# Patient Record
Sex: FEMALE | Race: WHITE | NOT HISPANIC OR LATINO
[De-identification: names, ages, dates, MRNs, and addresses within clinical notes are randomized per-mention and may not be internally consistent; named-entity substitution may affect disease eponyms.]

---

## 2017-08-23 ENCOUNTER — APPOINTMENT (OUTPATIENT)
Dept: MRI IMAGING | Facility: CLINIC | Age: 52
End: 2017-08-23

## 2017-08-23 ENCOUNTER — OUTPATIENT (OUTPATIENT)
Dept: OUTPATIENT SERVICES | Facility: HOSPITAL | Age: 52
LOS: 1 days | End: 2017-08-23
Payer: COMMERCIAL

## 2017-08-23 DIAGNOSIS — Z00.8 ENCOUNTER FOR OTHER GENERAL EXAMINATION: ICD-10-CM

## 2017-08-23 PROCEDURE — 73721 MRI JNT OF LWR EXTRE W/O DYE: CPT | Mod: 26,RT

## 2017-08-23 PROCEDURE — 73721 MRI JNT OF LWR EXTRE W/O DYE: CPT

## 2017-11-02 ENCOUNTER — OTHER (OUTPATIENT)
Age: 52
End: 2017-11-02

## 2018-03-29 ENCOUNTER — APPOINTMENT (OUTPATIENT)
Dept: MRI IMAGING | Facility: CLINIC | Age: 53
End: 2018-03-29
Payer: COMMERCIAL

## 2018-03-29 ENCOUNTER — OUTPATIENT (OUTPATIENT)
Dept: OUTPATIENT SERVICES | Facility: HOSPITAL | Age: 53
LOS: 1 days | End: 2018-03-29
Payer: COMMERCIAL

## 2018-03-29 DIAGNOSIS — Z00.8 ENCOUNTER FOR OTHER GENERAL EXAMINATION: ICD-10-CM

## 2018-03-29 PROCEDURE — 73221 MRI JOINT UPR EXTREM W/O DYE: CPT | Mod: 26,RT

## 2018-03-29 PROCEDURE — 73221 MRI JOINT UPR EXTREM W/O DYE: CPT

## 2018-06-22 ENCOUNTER — FORM ENCOUNTER (OUTPATIENT)
Age: 53
End: 2018-06-22

## 2018-06-23 ENCOUNTER — APPOINTMENT (OUTPATIENT)
Dept: MAMMOGRAPHY | Facility: CLINIC | Age: 53
End: 2018-06-23

## 2018-06-23 ENCOUNTER — OUTPATIENT (OUTPATIENT)
Dept: OUTPATIENT SERVICES | Facility: HOSPITAL | Age: 53
LOS: 1 days | End: 2018-06-23
Payer: COMMERCIAL

## 2018-06-23 DIAGNOSIS — Z00.8 ENCOUNTER FOR OTHER GENERAL EXAMINATION: ICD-10-CM

## 2018-06-23 PROCEDURE — 77067 SCR MAMMO BI INCL CAD: CPT

## 2018-06-23 PROCEDURE — 77063 BREAST TOMOSYNTHESIS BI: CPT | Mod: 26

## 2018-06-23 PROCEDURE — 77063 BREAST TOMOSYNTHESIS BI: CPT

## 2018-06-23 PROCEDURE — 77067 SCR MAMMO BI INCL CAD: CPT | Mod: 26

## 2018-07-01 ENCOUNTER — FORM ENCOUNTER (OUTPATIENT)
Age: 53
End: 2018-07-01

## 2018-07-02 ENCOUNTER — OUTPATIENT (OUTPATIENT)
Dept: OUTPATIENT SERVICES | Facility: HOSPITAL | Age: 53
LOS: 1 days | End: 2018-07-02
Payer: COMMERCIAL

## 2018-07-02 ENCOUNTER — APPOINTMENT (OUTPATIENT)
Dept: ULTRASOUND IMAGING | Facility: CLINIC | Age: 53
End: 2018-07-02
Payer: COMMERCIAL

## 2018-07-02 ENCOUNTER — RX RENEWAL (OUTPATIENT)
Age: 53
End: 2018-07-02

## 2018-07-02 ENCOUNTER — APPOINTMENT (OUTPATIENT)
Dept: MAMMOGRAPHY | Facility: CLINIC | Age: 53
End: 2018-07-02
Payer: COMMERCIAL

## 2018-07-02 DIAGNOSIS — Z00.8 ENCOUNTER FOR OTHER GENERAL EXAMINATION: ICD-10-CM

## 2018-07-02 PROCEDURE — G0279: CPT | Mod: 26

## 2018-07-02 PROCEDURE — 77065 DX MAMMO INCL CAD UNI: CPT | Mod: 26,RT

## 2018-07-02 PROCEDURE — 76642 ULTRASOUND BREAST LIMITED: CPT | Mod: 26,RT

## 2018-07-02 PROCEDURE — 77065 DX MAMMO INCL CAD UNI: CPT

## 2018-07-02 PROCEDURE — G0279: CPT

## 2018-07-02 PROCEDURE — 76642 ULTRASOUND BREAST LIMITED: CPT

## 2018-09-13 ENCOUNTER — TRANSCRIPTION ENCOUNTER (OUTPATIENT)
Age: 53
End: 2018-09-13

## 2018-11-22 ENCOUNTER — EMERGENCY (EMERGENCY)
Facility: HOSPITAL | Age: 53
LOS: 1 days | Discharge: HOME | End: 2018-11-22
Attending: EMERGENCY MEDICINE
Payer: COMMERCIAL

## 2018-11-22 VITALS
RESPIRATION RATE: 16 BRPM | HEIGHT: 62.5 IN | TEMPERATURE: 99 F | SYSTOLIC BLOOD PRESSURE: 140 MMHG | HEART RATE: 67 BPM | WEIGHT: 162.92 LBS | DIASTOLIC BLOOD PRESSURE: 81 MMHG | OXYGEN SATURATION: 99 %

## 2018-11-22 PROCEDURE — 99282 EMERGENCY DEPT VISIT SF MDM: CPT

## 2018-11-22 NOTE — ED PROVIDER NOTE - OBJECTIVE STATEMENT
54 yo F presents to ED for evaluation of avulsion to distal tip of R 4th finger sustained about 90 mins pta with a slicer while cutting potatoes. Tetanus UTD. Denies numbness/tingling.

## 2018-11-22 NOTE — ED ADULT NURSE NOTE - CHPI ED NUR SYMPTOMS NEG
no rectal pain/no redness/no blood in mucus/no chills/no drainage/no pain/no fever/no vomiting/no purulent drainage

## 2018-11-22 NOTE — ED PROVIDER NOTE - ATTENDING CONTRIBUTION TO CARE
I have personally performed a face to face diagnostic evaluation on this patient.  I have reviewed the PA note and agree with the history, exam, and plan of care, except as noted.  History and Exam by me shows right 4th finger avulsion from [place] slicer today.  right 4th finger- 1cm avulsion flap diameter.  Wound care applied.

## 2018-11-22 NOTE — ED ADULT NURSE NOTE - NSIMPLEMENTINTERV_GEN_ALL_ED
Implemented All Universal Safety Interventions:  [place] to call system. Call bell, personal items and telephone within reach. Instruct patient to call for assistance. Room bathroom lighting operational. Non-slip footwear when patient is off stretcher. Physically safe environment: no spills, clutter or unnecessary equipment. Stretcher in lowest position, wheels locked, appropriate side rails in place.

## 2018-11-22 NOTE — ED ADULT NURSE NOTE - OBJECTIVE STATEMENT
Pt A&Ox4, ambulatory to ED c/o laceration to finger.  Pt states she was slicing potatoes on a mandolin slicer and sustained laceration to right 4th finger.

## 2019-06-03 ENCOUNTER — APPOINTMENT (OUTPATIENT)
Dept: ULTRASOUND IMAGING | Facility: CLINIC | Age: 54
End: 2019-06-03
Payer: COMMERCIAL

## 2019-06-03 ENCOUNTER — OUTPATIENT (OUTPATIENT)
Dept: OUTPATIENT SERVICES | Facility: HOSPITAL | Age: 54
LOS: 1 days | End: 2019-06-03
Payer: COMMERCIAL

## 2019-06-03 DIAGNOSIS — Z00.8 ENCOUNTER FOR OTHER GENERAL EXAMINATION: ICD-10-CM

## 2019-06-03 PROCEDURE — 76830 TRANSVAGINAL US NON-OB: CPT

## 2019-06-03 PROCEDURE — 76856 US EXAM PELVIC COMPLETE: CPT | Mod: 26

## 2019-06-03 PROCEDURE — 76856 US EXAM PELVIC COMPLETE: CPT

## 2019-06-03 PROCEDURE — 76830 TRANSVAGINAL US NON-OB: CPT | Mod: 26

## 2019-06-04 ENCOUNTER — APPOINTMENT (OUTPATIENT)
Dept: UROGYNECOLOGY | Facility: CLINIC | Age: 54
End: 2019-06-04

## 2019-06-06 ENCOUNTER — TRANSCRIPTION ENCOUNTER (OUTPATIENT)
Age: 54
End: 2019-06-06

## 2019-06-06 ENCOUNTER — APPOINTMENT (OUTPATIENT)
Dept: UROLOGY | Facility: CLINIC | Age: 54
End: 2019-06-06
Payer: COMMERCIAL

## 2019-06-06 VITALS
WEIGHT: 160 LBS | SYSTOLIC BLOOD PRESSURE: 118 MMHG | DIASTOLIC BLOOD PRESSURE: 70 MMHG | HEART RATE: 77 BPM | OXYGEN SATURATION: 98 % | HEIGHT: 62 IN | BODY MASS INDEX: 29.44 KG/M2 | RESPIRATION RATE: 14 BRPM

## 2019-06-06 PROCEDURE — 99203 OFFICE O/P NEW LOW 30 MIN: CPT

## 2019-06-06 NOTE — REVIEW OF SYSTEMS
[Feeling Tired] : feeling tired [Earache] : earache [Dry Eyes] : dryness of the eyes [Cough] : cough [Sore Throat] : sore throat [Abdominal Pain] : abdominal pain [Diarrhea] : diarrhea [Dizziness] : dizziness [Constipation] : constipation [Hot Flashes] : hot flashes [Anxiety] : anxiety [Feelings Of Weakness] : feelings of weakness [Negative] : Integumentary [FreeTextEntry3] : sinus problems [FreeTextEntry2] : loss of interest, dysuria, nocturia, urgency, frequency, slow stream, leakage

## 2019-06-06 NOTE — HISTORY OF PRESENT ILLNESS
[FreeTextEntry1] : She is a 53-year-old woman who is seen today for initial visit. Patient states that she has bladder and uterine prolapse which is worsening recently. It worsens with physical activity and exercise. She also has stress incontinence but no significant urgency or urge incontinence. She had 5 vaginal deliveries up to 8 lbs. 9 oz. She has also seen a uro-gynecologist for another consultation. Residual urine today was 150 cc which is the same as her previous ultrasound. She is able to push her prolapse inside without difficulty.

## 2019-06-06 NOTE — ASSESSMENT
[FreeTextEntry1] : She does not fully empty her bladder. On physical exam today, no obvious prolapse except minimal rectocele was noted. Patient states that it does become worse with physical activity and she notices the bladder and the uterus near the vaginal opening. I believe her best option would be to consider hysterectomy vaginally or laparoscopic/robotically plus cystocele repair and sling placement. Risks and benefits of using mesh and also FDA warnings were discussed.

## 2019-06-06 NOTE — PHYSICAL EXAM
[General Appearance - Well Developed] : well developed [Normal Appearance] : normal appearance [General Appearance - Well Nourished] : well nourished [Well Groomed] : well groomed [General Appearance - In No Acute Distress] : no acute distress [Edema] : no peripheral edema [Exaggerated Use Of Accessory Muscles For Inspiration] : no accessory muscle use [Respiration, Rhythm And Depth] : normal respiratory rhythm and effort [Costovertebral Angle Tenderness] : no ~M costovertebral angle tenderness [Abdomen Tenderness] : non-tender [Abdomen Soft] : soft [Normal Station and Gait] : the gait and station were normal for the patient's age [FreeTextEntry1] : RN in room. No obvious prolapse or urine leak noted with cough on supine position. [] : no rash [Oriented To Time, Place, And Person] : oriented to person, place, and time [No Focal Deficits] : no focal deficits [Not Anxious] : not anxious [Mood] : the mood was normal [Affect] : the affect was normal [Inguinal Lymph Nodes Enlarged Bilaterally] : inguinal

## 2019-06-07 ENCOUNTER — APPOINTMENT (OUTPATIENT)
Dept: UROGYNECOLOGY | Facility: CLINIC | Age: 54
End: 2019-06-07
Payer: COMMERCIAL

## 2019-06-07 VITALS
DIASTOLIC BLOOD PRESSURE: 71 MMHG | TEMPERATURE: 97.7 F | BODY MASS INDEX: 29.44 KG/M2 | OXYGEN SATURATION: 100 % | HEIGHT: 62 IN | HEART RATE: 71 BPM | WEIGHT: 160 LBS | SYSTOLIC BLOOD PRESSURE: 102 MMHG

## 2019-06-07 DIAGNOSIS — Z83.511 FAMILY HISTORY OF GLAUCOMA: ICD-10-CM

## 2019-06-07 DIAGNOSIS — Z82.49 FAMILY HISTORY OF ISCHEMIC HEART DISEASE AND OTHER DISEASES OF THE CIRCULATORY SYSTEM: ICD-10-CM

## 2019-06-07 LAB
BILIRUB UR QL STRIP: NEGATIVE
CLARITY UR: CLEAR
COLLECTION METHOD: NORMAL
GLUCOSE UR-MCNC: NEGATIVE
HCG UR QL: 0.2 EU/DL
HGB UR QL STRIP.AUTO: NEGATIVE
KETONES UR-MCNC: NEGATIVE
LEUKOCYTE ESTERASE UR QL STRIP: NEGATIVE
NITRITE UR QL STRIP: NEGATIVE
PH UR STRIP: 5.5
PROT UR STRIP-MCNC: NEGATIVE
SP GR UR STRIP: 1.01

## 2019-06-07 PROCEDURE — 51701 INSERT BLADDER CATHETER: CPT

## 2019-06-07 PROCEDURE — 81003 URINALYSIS AUTO W/O SCOPE: CPT | Mod: QW

## 2019-06-07 PROCEDURE — 99204 OFFICE O/P NEW MOD 45 MIN: CPT | Mod: 25

## 2019-06-07 NOTE — HISTORY OF PRESENT ILLNESS
[FreeTextEntry1] : Few years of bothersome urinary frequency, urgency, leakage of urine with urgency as well as with positional changes / cough / sneeze; liners worn, currently nocturia 0-1. No fluid intake after 8pm anymore --> nocturia 0 usually. No dysuria, no gross hematuria, no flank pain. ? incomplete emptying vs frequency, flow normal and not interrupted. IBS, diarrhea predom. LMP 2017 then an episode of pink spotting last week, had TVUS earlier this week. No pain. Not SA at present. +Bulge and pressure in vagina, can palpate at introitus intermittently worse on active days and gravity-depending. Works at [employer], occasionally lifts.\par \par 6/3/19 TVUS: uterus 10 x 5 x 7cm, EE 4mm, R ov 2cm with 1cm simple cyst, L ov 2cm with <1cm simple cyst, no FF, PVR 170cc.

## 2019-06-07 NOTE — OB HISTORY
[Vaginal ___] : [unfilled] vaginal delivery(s) [FreeTextEntry1] : largest baby 8 /12 lbs [Sexually Active] : is not sexually active

## 2019-06-07 NOTE — PHYSICAL EXAM
[No Acute Distress] : in no acute distress [Oriented x3] : oriented to person, place, and time [No Edema] : ~T edema was not present [Symmetrical] : the neck was ~L symmetrical [None] : no CVA tenderness [Warm and Dry] : was warm and dry to touch [Normal Gait] : gait was normal [Labia Majora] : were normal [Labia Minora] : were normal [Bartholin's Gland] : both Bartholin's glands were normal  [Normal Appearance] : general appearance was normal [No Bleeding] : there was no active vaginal bleeding [2] : 2 [Aa ____] : Aa [unfilled] [Ba ____] : Ba [unfilled] [C ____] : C [unfilled] [GH ____] : GH [unfilled] [PB ____] : PB [unfilled] [TVL ____] : TVL  [unfilled] [Ap ____] : Ap [unfilled] [Bp ____] : Bp [unfilled] [D ____] : D [unfilled] [] : I [Normal] : normal [Soft] :  the cervix was soft [Post Void Residual ____ml] : post void residual via catheterization was [unfilled] ml [Exam Deferred] : was deferred [Tenderness] : ~T no ~M abdominal tenderness observed [Cough] : no cough [FreeTextEntry3] : +urethral hypermobility [Distended] : not distended [Inguinal LAD] : no adenopathy was noted in the inguinal lymph nodes [de-identified] : no appreciable masses or fullness [FreeTextEntry4] : no cysts lesions or mass [de-identified] : CST empty supine neg; uroflow normal voiding pattern and max flow rate

## 2019-06-07 NOTE — ASSESSMENT
[FreeTextEntry1] : Anjelica is a pleasant 52 yo P5, PSHx includes LSC appendectomy and cholecystectomy, presents with symptomatic POP and MICHAEL. On exam, her empty supine CST was neg, however she had positive urethral hypermobility. Her straight-cath PVR volume was normal and the dip was neg. On pelvic exam, there were no appreciable masses or lesions. Pelvic floor muscle contraction strength was present but weak. There was no levator or pelvic floor musculature banding, tightness, or tenderness. POPQ exam demonstrated stage II POP with primarily uterine descent.\par \par The patient has pelvic organ prolapse. Management options including observation, kegels with or without PT, biofeedback, pessary, and surgery were reviewed. Surgically, TVH/USLS reviewed, abdominally robotic THAD/ASC discussed. R/B/A and each surg discussed. Pessary care discussed. Her ovaries on sono - simple and at her age without strong fam/perosnal cancer hx leaving ovaries in situ discussed vs removal, R/B/A to each. Efficacies of procedures and risks reviewed briefly. She would like to think about her options.\par \par The etiology of OAB was discussed. Management options including observation, behavioral modifications (dietary changes, monitoring fluid intake, bladder training, timed voids, use of pads/protective garments), kegels, PT, medications, PTNS, SNS, and bladder Botox were all reviewed. The etiology of JOSUE was discussed. Management options including observation, behavioral modifications, medication, pessary, Impressa insert, periurethral bulking via cystoscopy, and surgery with midurethral sling were reviewed. She would like think think about options. All questions answered, RTO prn.\par \par Plan:\par [] obtain UDS report from Dr. Araujo office in 2018\par [] would like to think about options, if desires surgery here, next visit will be cystourethroscopy

## 2019-06-21 ENCOUNTER — APPOINTMENT (OUTPATIENT)
Dept: UROGYNECOLOGY | Facility: CLINIC | Age: 54
End: 2019-06-21

## 2019-08-19 ENCOUNTER — APPOINTMENT (OUTPATIENT)
Dept: UROGYNECOLOGY | Facility: CLINIC | Age: 54
End: 2019-08-19
Payer: COMMERCIAL

## 2019-08-19 PROCEDURE — 99214 OFFICE O/P EST MOD 30 MIN: CPT

## 2019-08-19 NOTE — PHYSICAL EXAM
[No Acute Distress] : in no acute distress [Oriented x3] : oriented to person, place, and time [Normal Memory] : ~T memory was ~L unimpaired [Normal Mood/Affect] : mood and affect are normal [None] : no CVA tenderness [Warm and Dry] : was warm and dry to touch [Normal Gait] : gait was normal [Labia Majora] : were normal [Labia Minora] : were normal [Normal Appearance] : general appearance was normal [Bartholin's Gland] : both Bartholin's glands were normal  [No Bleeding] : there was no active vaginal bleeding [2] : 2 [Aa ____] : Aa [unfilled] [Ba ____] : Ba [unfilled] [C ____] : C [unfilled] [GH ____] : GH [unfilled] [TVL ____] : TVL  [unfilled] [Ap ____] : Ap [unfilled] [Bp ____] : Bp [unfilled] [D ____] : D [unfilled] [] : I [Soft] :  the cervix was soft [Normal] : no abnormalities [Exam Deferred] : was deferred [Cough] : no cough [Tenderness] : ~T no ~M abdominal tenderness observed [Inguinal LAD] : no adenopathy was noted in the inguinal lymph nodes [Distended] : not distended

## 2019-08-19 NOTE — DISCUSSION/SUMMARY
[FreeTextEntry1] : We reviewed management options for her prolapse including: observation, pelvic floor exercises, pessary, and surgical management. We reviewed various surgical management options including vaginal, laparoscopic/robotic, abdominal procedures. We reviewed procedures involving hysterectomy as well as uterine sparing procedures. We also reviewed both mesh and non-mesh options. We discussed details of different surgical approaches and success rates/risks associated with the different procedures. Written IUGA information on prolapse treatment options was also provided to her to review. I counseled her that I agree with Dr García's assessment and plan. She will f/u with Dr García and obtain her urodynamic records from Dr Araujo and bring them with her to her next appt.

## 2019-08-19 NOTE — HISTORY OF PRESENT ILLNESS
[Cystocele (Obstetric)] : frequent [Uterine Prolapse] : none [Vaginal Wall Prolapse] : none [Rectal Prolapse] : none [Stress Incontinence] : none [Urge Incontinence Of Urine] : rare [Unable To Restrain Bowel Movement] : frequent [Urinary Frequency] : none [Feelings Of Urinary Urgency] : daily [x1] : once nightly [Urinary Tract Infection] : daily [Hematuria] : none [Constipation Obstructed Defecation] : frequent [Stool Visible Blood] : rare [] : years ago [Incomplete Emptying Of Stool] : none [de-identified] : 15-20x/day  [FreeTextEntry6] : I [de-identified] : Occurs with IBS flares  [FreeTextEntry1] : \yasmin Dejesus presents for consultation and additional opinion. She has pelvic organ prolapse and symptoms of mixed urinary incontinence. She has been seen by Dr Araujo, Dr Faustin, Dr Montanez and Dr García. She desires an additional opinion about her treatment options for her prolapse.

## 2019-08-21 ENCOUNTER — APPOINTMENT (OUTPATIENT)
Dept: UROGYNECOLOGY | Facility: CLINIC | Age: 54
End: 2019-08-21

## 2019-11-10 ENCOUNTER — TRANSCRIPTION ENCOUNTER (OUTPATIENT)
Age: 54
End: 2019-11-10

## 2019-12-30 ENCOUNTER — TRANSCRIPTION ENCOUNTER (OUTPATIENT)
Age: 54
End: 2019-12-30

## 2020-01-31 ENCOUNTER — TRANSCRIPTION ENCOUNTER (OUTPATIENT)
Age: 55
End: 2020-01-31

## 2020-08-27 NOTE — REASON FOR VISIT
[Pelvic Organ Prolapse] : pelvic organ prolapse [Urinary Incontinence] : urinary incontinence [Urine Frequency] : urine frequency

## 2020-08-28 ENCOUNTER — APPOINTMENT (OUTPATIENT)
Dept: UROGYNECOLOGY | Facility: CLINIC | Age: 55
End: 2020-08-28
Payer: COMMERCIAL

## 2020-08-28 PROCEDURE — 99214 OFFICE O/P EST MOD 30 MIN: CPT

## 2020-08-28 NOTE — ASSESSMENT
[FreeTextEntry1] : Stage II POP, MICHAEL, normal PVR. Reviewed options including observation, kegels with or without PT, biofeedback, pessary, and surgery were reviewed. Surg with or without hyst, with or without graft use, abd vs vag reviewed. Efficacies, R/B to each reviewed. POP correction to treat POP and not necessarily OAB symptoms reviewed. She wants surg, RTO for testing. All ques answered.\par \par Plan:\par [] cysto\par [] repeat UDS in setting of symptoms of JOSUE and neg CST/UDS prior testing --> ? MUS at time of surg\par [] surg discussion - anticipate booking RASCH / BS / ASC / ? MUS and APR / cysto\par

## 2020-08-28 NOTE — PHYSICAL EXAM
[Chaperone Present] : A chaperone was present in the examining room during all aspects of the physical examination [No Acute Distress] : in no acute distress [Oriented x3] : oriented to person, place, and time [Soft, Nontender] : the abdomen was soft and nontender [None] : no CVA tenderness [Labia Majora] : were normal [Labia Minora] : were normal [Normal Appearance] : general appearance was normal [No Bleeding] : there was no active vaginal bleeding [2] : 2 [Aa ____] : Aa [unfilled] [Ba ____] : Ba [unfilled] [C ____] : C [unfilled] [GH ____] : GH [unfilled] [PB ____] : PB [unfilled] [TVL ____] : TVL  [unfilled] [Ap ____] : Ap [unfilled] [Bp ____] : Bp [unfilled] [D ____] : D [unfilled] [] : I [Normal] : normal [Soft] :  the cervix was soft [Post Void Residual ____ml] : post void residual was [unfilled] ml [Exam Deferred] : was deferred [FreeTextEntry4] : no cyst mass or lesion [FreeTextEntry3] : +urethral hypermobility, CST neg [de-identified] : uterus mobile

## 2020-08-28 NOTE — HISTORY OF PRESENT ILLNESS
[FreeTextEntry1] : Patient last presented last year in 2019, found to have stage II POP with anterior and apical components; and MICHAEL, normal PVR. UDS report reviewed from 2018 elsewhere, low capacity bladder, normal PVR, no GSUI, no DO. 2019 TVUS: uterus 10 x 5 x 7cm, EE 4mm, R ov 2cm with 1cm simple cyst, L ov 2cm with <1cm simple cyst, no FF. Today, reports similar symptoms from last year - unchanged or perhaps little worsened bulge in vagina, some improved urge freq she attributes to PT (not pelvic floor, sounds like core work), and constipation is intermittent and diet-related. Unchanged small rare leakage of urine, unsure what situations. No hematuria, dysuria, UTIs, +subj incomplete emtpying.

## 2020-09-09 ENCOUNTER — APPOINTMENT (OUTPATIENT)
Dept: UROGYNECOLOGY | Facility: CLINIC | Age: 55
End: 2020-09-09
Payer: COMMERCIAL

## 2020-09-09 PROCEDURE — 51741 ELECTRO-UROFLOWMETRY FIRST: CPT

## 2020-09-09 PROCEDURE — 51784 ANAL/URINARY MUSCLE STUDY: CPT

## 2020-09-09 PROCEDURE — 51797 INTRAABDOMINAL PRESSURE TEST: CPT

## 2020-09-09 PROCEDURE — 51729 CYSTOMETROGRAM W/VP&UP: CPT

## 2020-09-11 ENCOUNTER — APPOINTMENT (OUTPATIENT)
Dept: UROGYNECOLOGY | Facility: CLINIC | Age: 55
End: 2020-09-11
Payer: COMMERCIAL

## 2020-09-11 DIAGNOSIS — R33.9 RETENTION OF URINE, UNSPECIFIED: ICD-10-CM

## 2020-09-11 PROCEDURE — 52000 CYSTOURETHROSCOPY: CPT

## 2020-09-11 PROCEDURE — 99212 OFFICE O/P EST SF 10 MIN: CPT | Mod: 25

## 2020-09-24 ENCOUNTER — TRANSCRIPTION ENCOUNTER (OUTPATIENT)
Age: 55
End: 2020-09-24

## 2020-10-02 ENCOUNTER — APPOINTMENT (OUTPATIENT)
Dept: UROGYNECOLOGY | Facility: CLINIC | Age: 55
End: 2020-10-02
Payer: COMMERCIAL

## 2020-10-02 DIAGNOSIS — R10.2 PELVIC AND PERINEAL PAIN: ICD-10-CM

## 2020-10-02 DIAGNOSIS — N39.46 MIXED INCONTINENCE: ICD-10-CM

## 2020-10-02 DIAGNOSIS — N81.4 UTEROVAGINAL PROLAPSE, UNSPECIFIED: ICD-10-CM

## 2020-10-02 DIAGNOSIS — R35.0 FREQUENCY OF MICTURITION: ICD-10-CM

## 2020-10-02 DIAGNOSIS — R35.1 NOCTURIA: ICD-10-CM

## 2020-10-02 DIAGNOSIS — R39.15 URGENCY OF URINATION: ICD-10-CM

## 2020-10-02 DIAGNOSIS — N81.9 FEMALE GENITAL PROLAPSE, UNSPECIFIED: ICD-10-CM

## 2020-10-02 DIAGNOSIS — R32 UNSPECIFIED URINARY INCONTINENCE: ICD-10-CM

## 2020-10-02 PROCEDURE — 99214 OFFICE O/P EST MOD 30 MIN: CPT

## 2020-10-02 NOTE — ASSESSMENT
[FreeTextEntry1] : Symptomatic stage II uterine prolapse desiring surgical correction. \par \par Abdominal versus vaginal routes of surgery were reviewed. Abdominal surgery via robotic laparoscopy vs laparotomy discussed. Surgery with or without hysterectomy discussed. Surgery with or without graft use discussed. Efficacies, risks and benefits reviewed. Abdominally, a supracervical hysterectomy plus sacral colpopexy was discussed. Vaginally, a total hysterectomy plus uterosacral ligament suspension was discussed. \par A THAD versus total hysterectomy was discussed. With THAD, risk of cervical cancer in the future and need for routine pap smear surveillance was discussed. Benefits of THAD including decreased risk of mesh exposure was discussed. Bilateral salpingectomy was discussed to decrease the future risk of ovarian cancer. The benefit of oophorectomy to decrease the future risk of ovarian cancer or need for future ovarian surgery was discussed. The protective benefits of retaining ovaries for bone and cardiac health as well as decreased all-cause mortality rate was discussed. \par \par She would like to proceed with EUA / RASCH / BS / ASC / ? APR / cysto. The risks and benefits of surgery were discussed and included: cardiopulmonary arrest, infection, abscess formation, bleeding, hematoma formation, hemorrhage, transfusion, blood clot formation, mesh erosion or exposure, fistula formation, rejection, dyspareunia, chronic pain, neuropathy, damage to surrounding structures including but not limited to bowel/ureters/bladder/rectum, leakage of urine, voiding dysfunction, OAB, urinary retention, procedure failure, recurrence, need for further surgery, and going home with a catheter. With the use of mesh, risk of mesh erosion or exposure discussed. The FDA warning on transvaginally placed mesh for prolapse correction versus abdominally placed mesh or transvaginally placed mesh for midurethral sling was reviewed. Website for the FDA provided for information as desired. Risk of postop OSUI discussed. An exam under anesthesia was discussed and consented to as well. Perioperative care, anesthesia, no eating or drinking after midnight prior to the surgery, and postop precautions were reviewed. All questions were answered. She understood and would like to proceed. Consent was signed and witnessed in the office, however she will go for plastics consult first and if ends up combo procedure, will do same thing but open instead of robotic.\par \par Plan:\par [] await booking pelvic EUA / RASCH / BS / ASC / possible APR / cystourethroscopy / other indicated procedures / possible laparotomy\par [] med clearance\par [] PSTs/COVID testing\par [] plastics referral - she desires abdominoplasty - if doing a combo case, would do my part exlap instead of robotic

## 2020-10-02 NOTE — HISTORY OF PRESENT ILLNESS
[FreeTextEntry1] : Stage II POP freq and urgency, symptomatic. Denies nonsurg correction such as observetion, kegels/PT, pessary use. Inquired about combo procedure today with plastics for abdominoplasty.\par \par UDS showing PVR normal, capacity low end of normal, no DO, no GSUI. \par \par Cysto normal. Initial exam PVR normal, A -2 / C 0 / GH 5 / P -3 / TVL 9 / D -6.\par \par TVUS 2019: 10 x 4.5 x 6.2 cm, 4 mm, R Ov nl with a 1 cm simple cyst, L ov with 0.7 cm simple cyst, no FF, bladder normal with  ml. \par \par PSH: LSC appendectomy, LSC cholecystectomy\par \par Allg: seasonal\par \par BMI 29\par \par Works as a school nurse

## 2020-11-13 ENCOUNTER — OUTPATIENT (OUTPATIENT)
Dept: OUTPATIENT SERVICES | Facility: HOSPITAL | Age: 55
LOS: 1 days | End: 2020-11-13
Payer: COMMERCIAL

## 2020-11-13 VITALS
HEART RATE: 65 BPM | SYSTOLIC BLOOD PRESSURE: 125 MMHG | HEIGHT: 62.5 IN | DIASTOLIC BLOOD PRESSURE: 84 MMHG | TEMPERATURE: 98 F | WEIGHT: 166.01 LBS | RESPIRATION RATE: 16 BRPM | OXYGEN SATURATION: 99 %

## 2020-11-13 DIAGNOSIS — N81.4 UTEROVAGINAL PROLAPSE, UNSPECIFIED: ICD-10-CM

## 2020-11-13 DIAGNOSIS — Z90.49 ACQUIRED ABSENCE OF OTHER SPECIFIED PARTS OF DIGESTIVE TRACT: Chronic | ICD-10-CM

## 2020-11-13 DIAGNOSIS — Z01.818 ENCOUNTER FOR OTHER PREPROCEDURAL EXAMINATION: ICD-10-CM

## 2020-11-13 DIAGNOSIS — N81.11 CYSTOCELE, MIDLINE: ICD-10-CM

## 2020-11-13 DIAGNOSIS — N81.6 RECTOCELE: ICD-10-CM

## 2020-11-13 DIAGNOSIS — N81.2 INCOMPLETE UTEROVAGINAL PROLAPSE: ICD-10-CM

## 2020-11-13 LAB
ANION GAP SERPL CALC-SCNC: 5 MMOL/L — SIGNIFICANT CHANGE UP (ref 5–17)
APPEARANCE UR: CLEAR — SIGNIFICANT CHANGE UP
BILIRUB UR-MCNC: NEGATIVE — SIGNIFICANT CHANGE UP
BUN SERPL-MCNC: 14 MG/DL — SIGNIFICANT CHANGE UP (ref 7–23)
CALCIUM SERPL-MCNC: 9.4 MG/DL — SIGNIFICANT CHANGE UP (ref 8.5–10.1)
CHLORIDE SERPL-SCNC: 107 MMOL/L — SIGNIFICANT CHANGE UP (ref 96–108)
CO2 SERPL-SCNC: 30 MMOL/L — SIGNIFICANT CHANGE UP (ref 22–31)
COLOR SPEC: YELLOW — SIGNIFICANT CHANGE UP
CREAT SERPL-MCNC: 0.84 MG/DL — SIGNIFICANT CHANGE UP (ref 0.5–1.3)
DIFF PNL FLD: NEGATIVE — SIGNIFICANT CHANGE UP
GLUCOSE SERPL-MCNC: 93 MG/DL — SIGNIFICANT CHANGE UP (ref 70–99)
GLUCOSE UR QL: NEGATIVE — SIGNIFICANT CHANGE UP
HCG UR QL: NEGATIVE — SIGNIFICANT CHANGE UP
HCT VFR BLD CALC: 42.5 % — SIGNIFICANT CHANGE UP (ref 34.5–45)
HGB BLD-MCNC: 14.9 G/DL — SIGNIFICANT CHANGE UP (ref 11.5–15.5)
KETONES UR-MCNC: NEGATIVE — SIGNIFICANT CHANGE UP
LEUKOCYTE ESTERASE UR-ACNC: ABNORMAL
MCHC RBC-ENTMCNC: 29.5 PG — SIGNIFICANT CHANGE UP (ref 27–34)
MCHC RBC-ENTMCNC: 35.1 GM/DL — SIGNIFICANT CHANGE UP (ref 32–36)
MCV RBC AUTO: 84.2 FL — SIGNIFICANT CHANGE UP (ref 80–100)
NITRITE UR-MCNC: NEGATIVE — SIGNIFICANT CHANGE UP
NRBC # BLD: 0 /100 WBCS — SIGNIFICANT CHANGE UP (ref 0–0)
PH UR: 6 — SIGNIFICANT CHANGE UP (ref 5–8)
PLATELET # BLD AUTO: 286 K/UL — SIGNIFICANT CHANGE UP (ref 150–400)
POTASSIUM SERPL-MCNC: 4.1 MMOL/L — SIGNIFICANT CHANGE UP (ref 3.5–5.3)
POTASSIUM SERPL-SCNC: 4.1 MMOL/L — SIGNIFICANT CHANGE UP (ref 3.5–5.3)
PROT UR-MCNC: NEGATIVE — SIGNIFICANT CHANGE UP
RBC # BLD: 5.05 M/UL — SIGNIFICANT CHANGE UP (ref 3.8–5.2)
RBC # FLD: 13 % — SIGNIFICANT CHANGE UP (ref 10.3–14.5)
SODIUM SERPL-SCNC: 142 MMOL/L — SIGNIFICANT CHANGE UP (ref 135–145)
SP GR SPEC: 1 — LOW (ref 1.01–1.02)
UROBILINOGEN FLD QL: NEGATIVE — SIGNIFICANT CHANGE UP
WBC # BLD: 12.02 K/UL — HIGH (ref 3.8–10.5)
WBC # FLD AUTO: 12.02 K/UL — HIGH (ref 3.8–10.5)

## 2020-11-13 PROCEDURE — G0463: CPT

## 2020-11-13 PROCEDURE — 86850 RBC ANTIBODY SCREEN: CPT

## 2020-11-13 PROCEDURE — 85027 COMPLETE CBC AUTOMATED: CPT

## 2020-11-13 PROCEDURE — 80048 BASIC METABOLIC PNL TOTAL CA: CPT

## 2020-11-13 PROCEDURE — 83036 HEMOGLOBIN GLYCOSYLATED A1C: CPT

## 2020-11-13 PROCEDURE — 36415 COLL VENOUS BLD VENIPUNCTURE: CPT

## 2020-11-13 PROCEDURE — 87086 URINE CULTURE/COLONY COUNT: CPT

## 2020-11-13 PROCEDURE — 81025 URINE PREGNANCY TEST: CPT

## 2020-11-13 PROCEDURE — 93005 ELECTROCARDIOGRAM TRACING: CPT

## 2020-11-13 PROCEDURE — 81001 URINALYSIS AUTO W/SCOPE: CPT

## 2020-11-13 PROCEDURE — 93010 ELECTROCARDIOGRAM REPORT: CPT

## 2020-11-13 PROCEDURE — 86900 BLOOD TYPING SEROLOGIC ABO: CPT

## 2020-11-13 PROCEDURE — 86901 BLOOD TYPING SEROLOGIC RH(D): CPT

## 2020-11-13 NOTE — H&P PST ADULT - NSICDXPROBLEM_GEN_ALL_CORE_FT
PROBLEM DIAGNOSES  Problem: Uterine prolapse  Assessment and Plan: Scheduled for abdominal supracervical hysterectomy- abdominal  sacrocolpopexy  and Anterior and posterior repair. Pre op testing today  Covid testing within 3 days prior to surgery. Information given.

## 2020-11-13 NOTE — H&P PST ADULT - NSICDXFAMILYHX_GEN_ALL_CORE_FT
FAMILY HISTORY:  FH: atrial fibrillation, Mother: 77  FH: breast cancer, Mother: 77  FH: COPD (chronic obstructive pulmonary disease), Mother: 77y  FH: lung cancer, Father:

## 2020-11-13 NOTE — H&P PST ADULT - ASSESSMENT
56 y/o with uterine prolapse, 54 y/o with uterine prolapse, Scheduled for abdominal supracervical hysterectomy- abdominal  sacrocolpopexy  and Anterior and posterior repair. Pre op testing today.  Medical eval advised.  Pre op instructions:  Hold OTC supplements. Medications reviewed for the week and morning of surgery.  NPO after 11pm to the morning of surgery.  Shower 2 days before and the morning of surgery with antibacterial soap as instructed.  Covid testing information given.  Patient verbalized understanding.

## 2020-11-13 NOTE — H&P PST ADULT - HISTORY OF PRESENT ILLNESS
54 y/o female with c/o uterine prolapse. C/o of some uine leakage, but feels it is not too bad. , Seen by Dr Montano couple of weeks ago. Scheduled for abdominal supracervical hysterectomy- abdominal  sacrocolpopexy  and Anterior and posterior repair. Pre op testing today

## 2020-11-13 NOTE — H&P PST ADULT - ATTENDING COMMENTS
Patient seen and examined, continues to desire surg correction of pelvic organ prolapse, declines nonsurg intervention such as observation, kegels, PT, pessary use. Risks of surg including but not limited to bleeding, hematoma formation, hemorrhage, transfusion, infections, mesh erosion or exposure, fistula formation, cardiopulmonary arrest, stroke, SBO, damage to surrounding structures such as bowel / bladder / ureters / rectum / nerves / vessels, occult JOSUE, OAB, voiding dysfunction, retention, going home with a catheter, chronic pain, dyspareunia, failure, need for further surg all discussed and she would like to proceed. EUA / open exlap THAD / BS / ASC / possible APR / other indicated procedures / cysto, plus abdominoplasty per plastics. All ques answered.

## 2020-11-13 NOTE — H&P PST ADULT - NSANTHOSAYNRD_GEN_A_CORE
2 yrs ago sleep study neg/No. MATHEW screening performed.  STOP BANG Legend: 0-2 = LOW Risk; 3-4 = INTERMEDIATE Risk; 5-8 = HIGH Risk

## 2020-11-13 NOTE — H&P PST ADULT - NSICDXPASTSURGICALHX_GEN_ALL_CORE_FT
PAST SURGICAL HISTORY:  S/P laparoscopic appendectomy 2010    S/P laparoscopic cholecystectomy 2012

## 2020-11-14 LAB
A1C WITH ESTIMATED AVERAGE GLUCOSE RESULT: 5.3 % — SIGNIFICANT CHANGE UP (ref 4–5.6)
ESTIMATED AVERAGE GLUCOSE: 105 MG/DL — SIGNIFICANT CHANGE UP (ref 68–114)

## 2020-11-15 LAB
CULTURE RESULTS: SIGNIFICANT CHANGE UP
SPECIMEN SOURCE: SIGNIFICANT CHANGE UP

## 2020-11-20 DIAGNOSIS — Z01.818 ENCOUNTER FOR OTHER PREPROCEDURAL EXAMINATION: ICD-10-CM

## 2020-11-20 PROBLEM — M25.511 PAIN IN RIGHT SHOULDER: Chronic | Status: ACTIVE | Noted: 2020-11-13

## 2020-11-21 ENCOUNTER — APPOINTMENT (OUTPATIENT)
Dept: DISASTER EMERGENCY | Facility: CLINIC | Age: 55
End: 2020-11-21

## 2020-11-22 ENCOUNTER — OUTPATIENT (OUTPATIENT)
Dept: OUTPATIENT SERVICES | Facility: HOSPITAL | Age: 55
LOS: 1 days | End: 2020-11-22
Payer: COMMERCIAL

## 2020-11-22 DIAGNOSIS — Z90.49 ACQUIRED ABSENCE OF OTHER SPECIFIED PARTS OF DIGESTIVE TRACT: Chronic | ICD-10-CM

## 2020-11-22 DIAGNOSIS — Z11.59 ENCOUNTER FOR SCREENING FOR OTHER VIRAL DISEASES: ICD-10-CM

## 2020-11-22 LAB — SARS-COV-2 RNA SPEC QL NAA+PROBE: SIGNIFICANT CHANGE UP

## 2020-11-22 PROCEDURE — U0003: CPT

## 2020-11-23 ENCOUNTER — TRANSCRIPTION ENCOUNTER (OUTPATIENT)
Age: 55
End: 2020-11-23

## 2020-11-24 ENCOUNTER — APPOINTMENT (OUTPATIENT)
Dept: UROGYNECOLOGY | Facility: HOSPITAL | Age: 55
End: 2020-11-24
Payer: COMMERCIAL

## 2020-11-24 ENCOUNTER — RESULT REVIEW (OUTPATIENT)
Age: 55
End: 2020-11-24

## 2020-11-24 ENCOUNTER — INPATIENT (INPATIENT)
Facility: HOSPITAL | Age: 55
LOS: 1 days | Discharge: HOME | DRG: 743 | End: 2020-11-26
Attending: OBSTETRICS & GYNECOLOGY | Admitting: OBSTETRICS & GYNECOLOGY
Payer: COMMERCIAL

## 2020-11-24 VITALS
HEIGHT: 62.5 IN | SYSTOLIC BLOOD PRESSURE: 114 MMHG | DIASTOLIC BLOOD PRESSURE: 79 MMHG | RESPIRATION RATE: 15 BRPM | TEMPERATURE: 98 F | OXYGEN SATURATION: 98 % | HEART RATE: 81 BPM | WEIGHT: 166.01 LBS

## 2020-11-24 DIAGNOSIS — Z90.49 ACQUIRED ABSENCE OF OTHER SPECIFIED PARTS OF DIGESTIVE TRACT: Chronic | ICD-10-CM

## 2020-11-24 DIAGNOSIS — N81.6 RECTOCELE: ICD-10-CM

## 2020-11-24 LAB — HCG UR QL: NEGATIVE — SIGNIFICANT CHANGE UP

## 2020-11-24 PROCEDURE — 58541 LSH UTERUS 250 G OR LESS: CPT

## 2020-11-24 PROCEDURE — 88302 TISSUE EXAM BY PATHOLOGIST: CPT | Mod: 26

## 2020-11-24 PROCEDURE — 88307 TISSUE EXAM BY PATHOLOGIST: CPT | Mod: 26

## 2020-11-24 PROCEDURE — 52000 CYSTOURETHROSCOPY: CPT | Mod: 59

## 2020-11-24 PROCEDURE — 57280 SUSPENSION OF VAGINA: CPT

## 2020-11-24 RX ORDER — ONDANSETRON 8 MG/1
4 TABLET, FILM COATED ORAL ONCE
Refills: 0 | Status: DISCONTINUED | OUTPATIENT
Start: 2020-11-24 | End: 2020-11-24

## 2020-11-24 RX ORDER — SODIUM CHLORIDE 9 MG/ML
1000 INJECTION, SOLUTION INTRAVENOUS
Refills: 0 | Status: DISCONTINUED | OUTPATIENT
Start: 2020-11-24 | End: 2020-11-24

## 2020-11-24 RX ORDER — CEFAZOLIN SODIUM 1 G
2000 VIAL (EA) INJECTION ONCE
Refills: 0 | Status: COMPLETED | OUTPATIENT
Start: 2020-11-24 | End: 2020-11-24

## 2020-11-24 RX ORDER — ACETAMINOPHEN 500 MG
1000 TABLET ORAL EVERY 6 HOURS
Refills: 0 | Status: DISCONTINUED | OUTPATIENT
Start: 2020-11-24 | End: 2020-11-26

## 2020-11-24 RX ORDER — ONDANSETRON 8 MG/1
4 TABLET, FILM COATED ORAL EVERY 6 HOURS
Refills: 0 | Status: DISCONTINUED | OUTPATIENT
Start: 2020-11-24 | End: 2020-11-24

## 2020-11-24 RX ORDER — ENOXAPARIN SODIUM 100 MG/ML
40 INJECTION SUBCUTANEOUS EVERY 12 HOURS
Refills: 0 | Status: DISCONTINUED | OUTPATIENT
Start: 2020-11-25 | End: 2020-11-26

## 2020-11-24 RX ORDER — DEXTROSE 50 % IN WATER 50 %
25 SYRINGE (ML) INTRAVENOUS ONCE
Refills: 0 | Status: DISCONTINUED | OUTPATIENT
Start: 2020-11-24 | End: 2020-11-24

## 2020-11-24 RX ORDER — CEFAZOLIN SODIUM 1 G
2000 VIAL (EA) INJECTION EVERY 8 HOURS
Refills: 0 | Status: COMPLETED | OUTPATIENT
Start: 2020-11-24 | End: 2020-11-25

## 2020-11-24 RX ORDER — OXYCODONE HYDROCHLORIDE 5 MG/1
5 TABLET ORAL ONCE
Refills: 0 | Status: DISCONTINUED | OUTPATIENT
Start: 2020-11-24 | End: 2020-11-24

## 2020-11-24 RX ORDER — INSULIN LISPRO 100/ML
VIAL (ML) SUBCUTANEOUS
Refills: 0 | Status: DISCONTINUED | OUTPATIENT
Start: 2020-11-24 | End: 2020-11-24

## 2020-11-24 RX ORDER — DEXTROSE 50 % IN WATER 50 %
12.5 SYRINGE (ML) INTRAVENOUS ONCE
Refills: 0 | Status: DISCONTINUED | OUTPATIENT
Start: 2020-11-24 | End: 2020-11-24

## 2020-11-24 RX ORDER — DEXTROSE 50 % IN WATER 50 %
15 SYRINGE (ML) INTRAVENOUS ONCE
Refills: 0 | Status: DISCONTINUED | OUTPATIENT
Start: 2020-11-24 | End: 2020-11-24

## 2020-11-24 RX ORDER — GLUCAGON INJECTION, SOLUTION 0.5 MG/.1ML
1 INJECTION, SOLUTION SUBCUTANEOUS ONCE
Refills: 0 | Status: DISCONTINUED | OUTPATIENT
Start: 2020-11-24 | End: 2020-11-24

## 2020-11-24 RX ORDER — HYDROMORPHONE HYDROCHLORIDE 2 MG/ML
0.5 INJECTION INTRAMUSCULAR; INTRAVENOUS; SUBCUTANEOUS
Refills: 0 | Status: DISCONTINUED | OUTPATIENT
Start: 2020-11-24 | End: 2020-11-24

## 2020-11-24 RX ORDER — ONDANSETRON 8 MG/1
4 TABLET, FILM COATED ORAL EVERY 6 HOURS
Refills: 0 | Status: DISCONTINUED | OUTPATIENT
Start: 2020-11-24 | End: 2020-11-26

## 2020-11-24 RX ORDER — SODIUM CHLORIDE 9 MG/ML
1000 INJECTION, SOLUTION INTRAVENOUS
Refills: 0 | Status: DISCONTINUED | OUTPATIENT
Start: 2020-11-24 | End: 2020-11-25

## 2020-11-24 RX ORDER — MORPHINE SULFATE 50 MG/1
4 CAPSULE, EXTENDED RELEASE ORAL EVERY 4 HOURS
Refills: 0 | Status: DISCONTINUED | OUTPATIENT
Start: 2020-11-24 | End: 2020-11-26

## 2020-11-24 RX ADMIN — MORPHINE SULFATE 4 MILLIGRAM(S): 50 CAPSULE, EXTENDED RELEASE ORAL at 18:24

## 2020-11-24 RX ADMIN — HYDROMORPHONE HYDROCHLORIDE 0.5 MILLIGRAM(S): 2 INJECTION INTRAMUSCULAR; INTRAVENOUS; SUBCUTANEOUS at 16:15

## 2020-11-24 RX ADMIN — MORPHINE SULFATE 4 MILLIGRAM(S): 50 CAPSULE, EXTENDED RELEASE ORAL at 17:54

## 2020-11-24 RX ADMIN — ONDANSETRON 4 MILLIGRAM(S): 8 TABLET, FILM COATED ORAL at 22:24

## 2020-11-24 RX ADMIN — SODIUM CHLORIDE 75 MILLILITER(S): 9 INJECTION, SOLUTION INTRAVENOUS at 16:17

## 2020-11-24 RX ADMIN — Medication 100 MILLIGRAM(S): at 20:30

## 2020-11-24 NOTE — PATIENT PROFILE ADULT - MONEY FOR FOOD
no Oxybutynin Counseling:  I discussed with the patient the risks of oxybutynin including but not limited to skin rash, drowsiness, dry mouth, difficulty urinating, and blurred vision.

## 2020-11-24 NOTE — BRIEF OPERATIVE NOTE - NSICDXBRIEFPROCEDURE_GEN_ALL_CORE_FT
PROCEDURES:  Cystoscopy 24-Nov-2020 15:50:12  Jerrell, Shantal  Sacrocolpopexy, using mesh 24-Nov-2020 15:50:03  Jerrell, Shantal  Bilateral salpingectomy 24-Nov-2020 15:49:09  Jerrell, Shantal  Open supracervical abdominal hysterectomy 24-Nov-2020 15:49:02  Shantal Allan

## 2020-11-24 NOTE — PROGRESS NOTE ADULT - SUBJECTIVE AND OBJECTIVE BOX
Post Operative Note  Patient: JOSELITO CHIU 55y (1965) Female   MRN: [id number]  Location: [place] [address] D1  Visit: 11-24-20 Inpatient  Date: 11-24-20 @ 17:37    Procedure: s/p ANURADHA, b/l salpingectomy, sacrocolpopexy, abdominoplasty,     Subjective:   56 y/o F seen and examined at bedside. Pt c/o abdominal pain however in NAD. Pt on regular diet however hasnt received a tray yet, without N/V. PT with trejo in place and NUNO  drain in place. Pt denies dizziness, chest pain, sob, nausea, vomiting, abdominal pain, or urinary complaints.    Objective:  Vitals: T(F): 98.4 (11-24-20 @ 17:28), Max: 98.4 (11-24-20 @ 17:28)  HR: 74 (11-24-20 @ 17:28)  BP: 129/75 (11-24-20 @ 17:28) (101/78 - 129/75)  RR: 18 (11-24-20 @ 17:28)  SpO2: 98% (11-24-20 @ 17:28)    In:   11-24-20 @ 07:01  -  11-24-20 @ 17:37  --------------------------------------------------------  IN: 0 mL      IV Fluids: dextrose 5%. 1000 milliLiter(s) (50 mL/Hr) IV Continuous <Continuous>  dextrose 5%. 1000 milliLiter(s) (100 mL/Hr) IV Continuous <Continuous>  lactated ringers. 1000 milliLiter(s) (100 mL/Hr) IV Continuous <Continuous>      Out:   11-24-20 @ 07:01  -  11-24-20 @ 17:37  --------------------------------------------------------  OUT: 175 mL      EBL:     Voided Urine:   11-24-20 @ 07:01 - 11-24-20 @ 17:37  --------------------------------------------------------  OUT: 175 mL      Trejo Catheter: yes     NUNO: Yes    11-24-20 @ 07:01  -  11-24-20 @ 17:37  --------------------------------------------------------  OUT: 100 mL      PHYSICAL EXAM  GENERAL:  Well-nourished, well-developed Female lying comfortably in bed in NAD  HEENT:  Sclera white. Mucous membranes moist.  CARDIO:  Regular rate and rhythm.  No murmur, gallop or rub appreciated.  RESPIRATORY: CTABL, no ronchi, rales or wheezing  ABDOMEN:  Soft, incisional ttp, nondistended, +bs, incisional site c/d/i, binder in place, NUNO in place with serosanguinous drainage,   EXTREMITIES: No calf tenderness  SKIN:  No jaundice, pallor, or cyanosis  NEURO:  A&O x 3      Medications: [Standing]  ceFAZolin   IVPB 2000 milliGRAM(s) IV Intermittent every 8 hours  dextrose 40% Gel 15 Gram(s) Oral once  dextrose 5%. 1000 milliLiter(s) IV Continuous <Continuous>  dextrose 5%. 1000 milliLiter(s) IV Continuous <Continuous>  dextrose 50% Injectable 25 Gram(s) IV Push once  dextrose 50% Injectable 12.5 Gram(s) IV Push once  dextrose 50% Injectable 25 Gram(s) IV Push once  glucagon  Injectable 1 milliGRAM(s) IntraMuscular once  insulin lispro (ADMELOG) corrective regimen sliding scale   SubCutaneous three times a day before meals  lactated ringers. 1000 milliLiter(s) IV Continuous <Continuous>  morphine  - Injectable 4 milliGRAM(s) IV Push every 4 hours PRN    Medications: [PRN]  ceFAZolin   IVPB 2000 milliGRAM(s) IV Intermittent every 8 hours  dextrose 40% Gel 15 Gram(s) Oral once  dextrose 5%. 1000 milliLiter(s) IV Continuous <Continuous>  dextrose 5%. 1000 milliLiter(s) IV Continuous <Continuous>  dextrose 50% Injectable 25 Gram(s) IV Push once  dextrose 50% Injectable 12.5 Gram(s) IV Push once  dextrose 50% Injectable 25 Gram(s) IV Push once  glucagon  Injectable 1 milliGRAM(s) IntraMuscular once  insulin lispro (ADMELOG) corrective regimen sliding scale   SubCutaneous three times a day before meals  lactated ringers. 1000 milliLiter(s) IV Continuous <Continuous>  morphine  - Injectable 4 milliGRAM(s) IV Push every 4 hours PRN    Assessment:  56 y/o F s/p ANURADHA, b/l salpingectomy, sacrocolpopexy, abdominoplasty, with trejo in place, NUNO drain in place with serosanguinous output,     Plan:  - cont reg diet  - f/u am labs  - serial abdominal exams  - trend NUNO outputs,   - cont post-op IV abx  - Pain control, supportive care  - Zofran PRN for nausea  - Incentive spirometry  - OOB to chair/ambulation with assistance as tolerated  - SCDs  - Will continue to monitor

## 2020-11-24 NOTE — BRIEF OPERATIVE NOTE - OPERATION/FINDINGS
Stage 2 uterovaginal prolapse. Small mobile uterus. Normal appearing fallopian tubes and ovaries.   On cystoscopy, normal bladder mucosa. No suture, mesh, injury noted. Bilateral ureteral orifices identified and effluxing clear yellow urine.     Combined case with Dr Martinez, who performed an abdominoplasty and liposuction

## 2020-11-25 LAB
ANION GAP SERPL CALC-SCNC: 8 MMOL/L — SIGNIFICANT CHANGE UP (ref 5–17)
BASOPHILS # BLD AUTO: 0.04 K/UL — SIGNIFICANT CHANGE UP (ref 0–0.2)
BASOPHILS NFR BLD AUTO: 0.2 % — SIGNIFICANT CHANGE UP (ref 0–2)
BUN SERPL-MCNC: 9 MG/DL — SIGNIFICANT CHANGE UP (ref 7–23)
CALCIUM SERPL-MCNC: 8.3 MG/DL — LOW (ref 8.5–10.1)
CHLORIDE SERPL-SCNC: 106 MMOL/L — SIGNIFICANT CHANGE UP (ref 96–108)
CO2 SERPL-SCNC: 27 MMOL/L — SIGNIFICANT CHANGE UP (ref 22–31)
CREAT SERPL-MCNC: 0.9 MG/DL — SIGNIFICANT CHANGE UP (ref 0.5–1.3)
EOSINOPHIL # BLD AUTO: 0 K/UL — SIGNIFICANT CHANGE UP (ref 0–0.5)
EOSINOPHIL NFR BLD AUTO: 0 % — SIGNIFICANT CHANGE UP (ref 0–6)
GLUCOSE SERPL-MCNC: 128 MG/DL — HIGH (ref 70–99)
HCT VFR BLD CALC: 35.1 % — SIGNIFICANT CHANGE UP (ref 34.5–45)
HGB BLD-MCNC: 11.9 G/DL — SIGNIFICANT CHANGE UP (ref 11.5–15.5)
IMM GRANULOCYTES NFR BLD AUTO: 0.5 % — SIGNIFICANT CHANGE UP (ref 0–1.5)
LYMPHOCYTES # BLD AUTO: 1.98 K/UL — SIGNIFICANT CHANGE UP (ref 1–3.3)
LYMPHOCYTES # BLD AUTO: 9.5 % — LOW (ref 13–44)
MCHC RBC-ENTMCNC: 29 PG — SIGNIFICANT CHANGE UP (ref 27–34)
MCHC RBC-ENTMCNC: 33.9 GM/DL — SIGNIFICANT CHANGE UP (ref 32–36)
MCV RBC AUTO: 85.4 FL — SIGNIFICANT CHANGE UP (ref 80–100)
MONOCYTES # BLD AUTO: 1.29 K/UL — HIGH (ref 0–0.9)
MONOCYTES NFR BLD AUTO: 6.2 % — SIGNIFICANT CHANGE UP (ref 2–14)
NEUTROPHILS # BLD AUTO: 17.43 K/UL — HIGH (ref 1.8–7.4)
NEUTROPHILS NFR BLD AUTO: 83.6 % — HIGH (ref 43–77)
NRBC # BLD: 0 /100 WBCS — SIGNIFICANT CHANGE UP (ref 0–0)
PLATELET # BLD AUTO: 248 K/UL — SIGNIFICANT CHANGE UP (ref 150–400)
POTASSIUM SERPL-MCNC: 3.9 MMOL/L — SIGNIFICANT CHANGE UP (ref 3.5–5.3)
POTASSIUM SERPL-SCNC: 3.9 MMOL/L — SIGNIFICANT CHANGE UP (ref 3.5–5.3)
RBC # BLD: 4.11 M/UL — SIGNIFICANT CHANGE UP (ref 3.8–5.2)
RBC # FLD: 13.1 % — SIGNIFICANT CHANGE UP (ref 10.3–14.5)
SODIUM SERPL-SCNC: 141 MMOL/L — SIGNIFICANT CHANGE UP (ref 135–145)
WBC # BLD: 20.85 K/UL — HIGH (ref 3.8–10.5)
WBC # FLD AUTO: 20.85 K/UL — HIGH (ref 3.8–10.5)

## 2020-11-25 RX ORDER — POLYETHYLENE GLYCOL 3350 17 G/17G
17 POWDER, FOR SOLUTION ORAL DAILY
Refills: 0 | Status: DISCONTINUED | OUTPATIENT
Start: 2020-11-25 | End: 2020-11-26

## 2020-11-25 RX ORDER — OXYCODONE AND ACETAMINOPHEN 5; 325 MG/1; MG/1
1 TABLET ORAL EVERY 6 HOURS
Refills: 0 | Status: DISCONTINUED | OUTPATIENT
Start: 2020-11-25 | End: 2020-11-26

## 2020-11-25 RX ORDER — ONDANSETRON 8 MG/1
4 TABLET, FILM COATED ORAL ONCE
Refills: 0 | Status: COMPLETED | OUTPATIENT
Start: 2020-11-25 | End: 2020-11-25

## 2020-11-25 RX ADMIN — ONDANSETRON 4 MILLIGRAM(S): 8 TABLET, FILM COATED ORAL at 03:31

## 2020-11-25 RX ADMIN — OXYCODONE AND ACETAMINOPHEN 1 TABLET(S): 5; 325 TABLET ORAL at 10:56

## 2020-11-25 RX ADMIN — MORPHINE SULFATE 4 MILLIGRAM(S): 50 CAPSULE, EXTENDED RELEASE ORAL at 15:56

## 2020-11-25 RX ADMIN — MORPHINE SULFATE 4 MILLIGRAM(S): 50 CAPSULE, EXTENDED RELEASE ORAL at 16:30

## 2020-11-25 RX ADMIN — ONDANSETRON 4 MILLIGRAM(S): 8 TABLET, FILM COATED ORAL at 09:57

## 2020-11-25 RX ADMIN — ENOXAPARIN SODIUM 40 MILLIGRAM(S): 100 INJECTION SUBCUTANEOUS at 09:51

## 2020-11-25 RX ADMIN — ENOXAPARIN SODIUM 40 MILLIGRAM(S): 100 INJECTION SUBCUTANEOUS at 21:10

## 2020-11-25 RX ADMIN — Medication 1000 MILLIGRAM(S): at 07:32

## 2020-11-25 RX ADMIN — OXYCODONE AND ACETAMINOPHEN 1 TABLET(S): 5; 325 TABLET ORAL at 23:10

## 2020-11-25 RX ADMIN — POLYETHYLENE GLYCOL 3350 17 GRAM(S): 17 POWDER, FOR SOLUTION ORAL at 12:39

## 2020-11-25 RX ADMIN — Medication 100 MILLIGRAM(S): at 04:50

## 2020-11-25 RX ADMIN — OXYCODONE AND ACETAMINOPHEN 1 TABLET(S): 5; 325 TABLET ORAL at 09:56

## 2020-11-25 RX ADMIN — OXYCODONE AND ACETAMINOPHEN 1 TABLET(S): 5; 325 TABLET ORAL at 22:10

## 2020-11-25 RX ADMIN — Medication 1000 MILLIGRAM(S): at 06:12

## 2020-11-25 RX ADMIN — ONDANSETRON 4 MILLIGRAM(S): 8 TABLET, FILM COATED ORAL at 18:42

## 2020-11-25 NOTE — ANESTHESIA FOLLOW-UP NOTE - NSEVALATIONFT_GEN_ALL_CORE
Patient not examined due to high risk of transmission of SARS-CoV2.  Chart including labs and vital signs reviewed.  Pain control per primary team

## 2020-11-25 NOTE — PROGRESS NOTE ADULT - SUBJECTIVE AND OBJECTIVE BOX
STATUS POST:  Open supracervical hysterectomy, BSO, sacrocolpexy, abdominoplasty, abdomen and hip liposuction    POST OPERATIVE DAY #:  1    SUBJECTIVE:  Patient seen and examined at bedside.  No overnight events.  Patient reports postoperative abdominal pain, especially with movement; has been unable to ambulate secondary to the pain.  Reports tolerating ice chips only.  Admits to nausea requiring 2 doses of zofran last night.  Will undergo TOV this AM.  Patient denies any fevers, chills, chest pain, shortness of breath, vomiting, vaginal bleeding.    Vital Signs Last 24 Hrs  T(C): 37.7 (25 Nov 2020 05:13), Max: 37.7 (25 Nov 2020 05:13)  T(F): 99.9 (25 Nov 2020 05:13), Max: 99.9 (25 Nov 2020 05:13)  HR: 100 (25 Nov 2020 05:13) (62 - 100)  BP: 112/71 (25 Nov 2020 05:13) (101/78 - 129/75)  RR: 18 (25 Nov 2020 05:13) (14 - 20)  SpO2: 95% (25 Nov 2020 05:13) (95% - 100%)    PHYSICAL EXAM  GENERAL:  Well-nourished, well-developed female lying in bed, in NAD  HEENT:  Sclera white. Mucous membranes moist.  CARDIO:  Regular rate and rhythm.  No murmur, gallop or rub appreciated.  RESPIRATORY:  Clear to auscultation bilaterally.  No wheezing, rales or rhonchi appreciated.  ABDOMEN:  Soft, nondistended, +moderately TTP along lower abdominal incision, no surrounding skin erythema or bleeding.  Left and right NUNO drain in place with approximately 20cc serosanguinous fluid in each.  Abdominal binder in place.  No rebound tenderness or guarding.  SKIN:  No jaundice, pallor, or cyanosis  NEURO:  A&O x 3    I&O's Summary    24 Nov 2020 07:01  -  25 Nov 2020 06:47  --------------------------------------------------------  IN: 0 mL / OUT: 1115 mL / NET: -1115 mL    I&O's Detail    24 Nov 2020 07:01  -  25 Nov 2020 06:47  --------------------------------------------------------  IN:  Total IN: 0 mL    OUT:    Drain (mL): 100 mL    Drain (mL): 90 mL    Indwelling Catheter - Urethral (mL): 925 mL  Total OUT: 1115 mL    Total NET: -1115 mL    MEDICATIONS  (STANDING):  enoxaparin Injectable 40 milliGRAM(s) SubCutaneous every 12 hours  lactated ringers. 1000 milliLiter(s) (100 mL/Hr) IV Continuous <Continuous>    MEDICATIONS  (PRN):  acetaminophen   Tablet .. 1000 milliGRAM(s) Oral every 6 hours PRN Mild Pain (1 - 3), Moderate Pain (4 - 6)  morphine  - Injectable 4 milliGRAM(s) IV Push every 4 hours PRN Severe Pain (7 - 10)  ondansetron Injectable 4 milliGRAM(s) IV Push every 6 hours PRN Nausea and/or Vomiting    LABS:  pending    ASSESSMENT & PLAN  55 year old female POD#1 s/p open supracervical hysterectomy, BSO, sacrocolpexy, abdominoplasty, abdomen and hip liposuction.    - Continue regular diet as tolerated  - Trial of void  - Drain monitoring  - DVT prophylaxis with lovenox  - Encourage ambulation  - Incentive spirometry  - Pain control, zofran PRN  - Follow up AM labs

## 2020-11-25 NOTE — PROGRESS NOTE ADULT - SUBJECTIVE AND OBJECTIVE BOX
Resting in bed.  c/o mod pain  VSS - afebrile  Liliane's - 100,90 / post-op    PE:  suture lines c/d/i  skin flap and umb with good color throughout  no ischemia or cyanosis  no visible or palpable collections  generalized mild tenderness  Liliane's with thinning serosanguinous output    POd #1  Doing well  on voiding trial  cont liliane's, binder  OOB and ambulate  diet as tolerated  instructed in positioning, activity level, monitoring, etc...

## 2020-11-26 ENCOUNTER — TRANSCRIPTION ENCOUNTER (OUTPATIENT)
Age: 55
End: 2020-11-26

## 2020-11-26 VITALS
HEART RATE: 86 BPM | DIASTOLIC BLOOD PRESSURE: 73 MMHG | SYSTOLIC BLOOD PRESSURE: 110 MMHG | RESPIRATION RATE: 16 BRPM | TEMPERATURE: 98 F | OXYGEN SATURATION: 95 %

## 2020-11-26 LAB
HCT VFR BLD CALC: 34.3 % — LOW (ref 34.5–45)
HGB BLD-MCNC: 11.4 G/DL — LOW (ref 11.5–15.5)
MCHC RBC-ENTMCNC: 28.7 PG — SIGNIFICANT CHANGE UP (ref 27–34)
MCHC RBC-ENTMCNC: 33.2 GM/DL — SIGNIFICANT CHANGE UP (ref 32–36)
MCV RBC AUTO: 86.4 FL — SIGNIFICANT CHANGE UP (ref 80–100)
NRBC # BLD: 0 /100 WBCS — SIGNIFICANT CHANGE UP (ref 0–0)
PLATELET # BLD AUTO: 226 K/UL — SIGNIFICANT CHANGE UP (ref 150–400)
RBC # BLD: 3.97 M/UL — SIGNIFICANT CHANGE UP (ref 3.8–5.2)
RBC # FLD: 13.1 % — SIGNIFICANT CHANGE UP (ref 10.3–14.5)
WBC # BLD: 15.83 K/UL — HIGH (ref 3.8–10.5)
WBC # FLD AUTO: 15.83 K/UL — HIGH (ref 3.8–10.5)

## 2020-11-26 PROCEDURE — C1889: CPT

## 2020-11-26 PROCEDURE — 81025 URINE PREGNANCY TEST: CPT

## 2020-11-26 PROCEDURE — 88302 TISSUE EXAM BY PATHOLOGIST: CPT

## 2020-11-26 PROCEDURE — 36415 COLL VENOUS BLD VENIPUNCTURE: CPT

## 2020-11-26 PROCEDURE — 82962 GLUCOSE BLOOD TEST: CPT

## 2020-11-26 PROCEDURE — C1781: CPT

## 2020-11-26 PROCEDURE — 85027 COMPLETE CBC AUTOMATED: CPT

## 2020-11-26 PROCEDURE — 88307 TISSUE EXAM BY PATHOLOGIST: CPT

## 2020-11-26 PROCEDURE — 85025 COMPLETE CBC W/AUTO DIFF WBC: CPT

## 2020-11-26 PROCEDURE — 80048 BASIC METABOLIC PNL TOTAL CA: CPT

## 2020-11-26 RX ORDER — ONDANSETRON 8 MG/1
1 TABLET, FILM COATED ORAL
Qty: 6 | Refills: 0
Start: 2020-11-26

## 2020-11-26 RX ADMIN — POLYETHYLENE GLYCOL 3350 17 GRAM(S): 17 POWDER, FOR SOLUTION ORAL at 12:02

## 2020-11-26 RX ADMIN — ONDANSETRON 4 MILLIGRAM(S): 8 TABLET, FILM COATED ORAL at 08:02

## 2020-11-26 RX ADMIN — Medication 1000 MILLIGRAM(S): at 07:50

## 2020-11-26 RX ADMIN — ENOXAPARIN SODIUM 40 MILLIGRAM(S): 100 INJECTION SUBCUTANEOUS at 09:11

## 2020-11-26 RX ADMIN — Medication 1000 MILLIGRAM(S): at 07:21

## 2020-11-26 NOTE — DISCHARGE NOTE NURSING/CASE MANAGEMENT/SOCIAL WORK - PATIENT PORTAL LINK FT
You can access the FollowMyHealth Patient Portal offered by [facility] by registering at the following website: [email or web address]. By joining [employer]’s FollowMyHealth portal, you will also be able to view your health information using other applications (apps) compatible with our system.

## 2020-11-26 NOTE — DISCHARGE NOTE PROVIDER - NSDCMRMEDTOKEN_GEN_ALL_CORE_FT
CHIEF COMPLAINT: f/u     SUBJECTIVE / OVERNIGHT EVENTS: Patient seen and examined. No acute events overnight. Pain well controlled and patient without any complaints.    MEDICATIONS  (STANDING):  acetaminophen   Tablet .. 975 milliGRAM(s) Oral every 12 hours  aspirin enteric coated 81 milliGRAM(s) Oral daily  enoxaparin Injectable 40 milliGRAM(s) SubCutaneous daily  influenza   Vaccine 0.5 milliLiter(s) IntraMuscular once  senna 2 Tablet(s) Oral at bedtime  vancomycin  IVPB 1250 milliGRAM(s) IV Intermittent every 24 hours    MEDICATIONS  (PRN):  benzocaine 15 mG/menthol 3.6 mG Lozenge 1 Lozenge Oral three times a day PRN Sore Throat  bisacodyl Suppository 10 milliGRAM(s) Rectal daily PRN Constipation  docusate sodium 100 milliGRAM(s) Oral three times a day PRN Constipation  ondansetron Injectable 4 milliGRAM(s) IV Push every 6 hours PRN Nausea and/or Vomiting  oxyCODONE    IR 5 milliGRAM(s) Oral every 4 hours PRN Moderate Pain (4 - 6)  oxyCODONE    IR 10 milliGRAM(s) Oral every 4 hours PRN Severe Pain (7 - 10)      VITALS:  T(F): 98.3 (10-05-18 @ 17:49), Max: 99.2 (10-04-18 @ 23:13)  HR: 57 (10-05-18 @ 17:49) (50 - 62)  BP: 110/94 (10-05-18 @ 17:49) (90/55 - 115/81)  RR: 17 (10-05-18 @ 17:49) (16 - 19)  SpO2: 99% (10-05-18 @ 17:49)  Wt(kg): --      CAPILLARY BLOOD GLUCOSE    PHYSICAL EXAM:  GENERAL: NAD, well-developed  HEAD:  Atraumatic, Normocephalic  EYES: EOMI, PERRLA, conjunctiva and sclera clear  NECK: Supple, No JVD  CHEST/LUNG: Clear to auscultation bilaterally; No wheeze  HEART: Regular rate and rhythm; No murmurs, rubs, or gallops  ABDOMEN: Soft, Nontender, Nondistended; Bowel sounds present  EXTREMITIES:  2+ Peripheral Pulses, No clubbing, cyanosis, or edema  PSYCH: AAOx3  NEUROLOGY: non-focal  SKIN: No rashes or lesions    LABS:              8.1                  138  | 28   | 16           7.87  >-----------< 326     ------------------------< 132                   25.4                 4.5  | 100  | 0.63                                         Ca 7.8   Mg x     Ph x           TPro  4.9  /  Alb  2.0      TBili  < 0.2  /  DBili  x         AST  16  /  ALT  27            AlkPhos  94              [ ] Consultant(s) Notes Reviewed:  [x] Care Discussed with Consultants/Other Providers: Orthopedic PA - discussed CHIEF COMPLAINT: f/u     SUBJECTIVE / OVERNIGHT EVENTS:  [employer]  utilized for [place], ID# [id number].   Patient seen and examined. Patient complaining of multiple BMs and abdominal discomfort after getting suppository yesterday evening. Patient had another bedside I&D by plastic surgery earlier this morning. Patient denies fever, chills. No SOB or chest pain.        MEDICATIONS  (STANDING):  acetaminophen   Tablet .. 975 milliGRAM(s) Oral every 12 hours  aspirin enteric coated 81 milliGRAM(s) Oral daily  enoxaparin Injectable 40 milliGRAM(s) SubCutaneous daily  influenza   Vaccine 0.5 milliLiter(s) IntraMuscular once  senna 2 Tablet(s) Oral at bedtime  vancomycin  IVPB 1250 milliGRAM(s) IV Intermittent every 24 hours    MEDICATIONS  (PRN):  benzocaine 15 mG/menthol 3.6 mG Lozenge 1 Lozenge Oral three times a day PRN Sore Throat  bisacodyl Suppository 10 milliGRAM(s) Rectal daily PRN Constipation  docusate sodium 100 milliGRAM(s) Oral three times a day PRN Constipation  ondansetron Injectable 4 milliGRAM(s) IV Push every 6 hours PRN Nausea and/or Vomiting  oxyCODONE    IR 5 milliGRAM(s) Oral every 4 hours PRN Moderate Pain (4 - 6)  oxyCODONE    IR 10 milliGRAM(s) Oral every 4 hours PRN Severe Pain (7 - 10)      VITALS:  T(F): 98.3 (10-05-18 @ 17:49), Max: 99.2 (10-04-18 @ 23:13)  HR: 57 (10-05-18 @ 17:49) (50 - 62)  BP: 110/94 (10-05-18 @ 17:49) (90/55 - 115/81)  RR: 17 (10-05-18 @ 17:49) (16 - 19)  SpO2: 99% (10-05-18 @ 17:49)        PHYSICAL EXAM:  GENERAL: Laying in bed in NAD  CHEST/LUNG: decreased BS at lung bases b/l but otherwise clear  HEART: Regular rate and rhythm; No murmurs, rubs, or gallops  ABDOMEN/PELVIS: Dressing over left sided surgical site - saturated with serous drainage. Mild erythema with noted edema of mons pubis, edema of left thigh and dependent area of flanks. Blackening of skin tissue medial to skin flap  EXTREMITIES: Left upper thigh with saturated dressing; b/l lower leg edema  PSYCH: Alert & Oriented, follows commands; pleasant      LABS:              8.1                  138  | 28   | 16           7.87  >-----------< 326     ------------------------< 132                   25.4                 4.5  | 100  | 0.63                                         Ca 7.8   Mg x     Ph x           TPro  4.9  /  Alb  2.0      TBili  < 0.2  /  DBili  x         AST  16  /  ALT  27            AlkPhos  94              [ ] Consultant(s) Notes Reviewed:  [x] Care Discussed with Consultants/Other Providers: Orthopedic PA - discussed Align 4 mg oral capsule: 1 cap(s) orally once a day  Mobic 15 mg oral tablet: 1 tab(s) orally once a day  Multiple Vitamins oral tablet: 1 tab(s) orally once a day  Vitamin D3 1000 intl units (25 mcg) oral capsule: once daily

## 2020-11-26 NOTE — DISCHARGE NOTE PROVIDER - CARE PROVIDER_API CALL
Jadyn García)  Female Pelvic MedReconst Surg; Obstetrics and Gynecology  [address], [address]  [place] [address]  Phone: [phone]  Fax: [phone]  Follow Up Time:     Pramod Martinez  PLASTIC SURGERY  [address] [place] [address]  Phone: [phone]  Fax: [phone]  Follow Up Time:

## 2020-11-26 NOTE — PROGRESS NOTE ADULT - SUBJECTIVE AND OBJECTIVE BOX
Resting in bed.  C/O occasional pain.  Tm- 99.4  VSS  NUNO's - 130,148 / 24 hrs    PE:  suture lines intact, no dehiscence, no separation  skin flap and umb with good color throughout  no excessive tension  no ischemia or cyanosis  no visible or palpable collections  minimal tenderness  NUNO's with thin serosanguinous output, no clots    POd #2  Doing well  Encouraged OOB and ambulate  Reg Diet  Cont NUNO's, binder  f/u in office post d/c

## 2020-11-26 NOTE — DISCHARGE NOTE PROVIDER - CARE PROVIDERS DIRECT ADDRESSES
[email or web address]

## 2020-11-26 NOTE — PROGRESS NOTE ADULT - SUBJECTIVE AND OBJECTIVE BOX
POD #  2    SUBJECTIVE:  56 y/o F seen and examined at bedside. Pt offers no complaints at this time in NAD. PT tolerating reg diet without N/V. PT denies any fevers, chills, chest pain, shortness of breath, abdominal pain, nausea, vomiting or diarrhea.    Vital Signs Last 24 Hrs  T(C): 37 (26 Nov 2020 05:06), Max: 37.4 (25 Nov 2020 21:41)  T(F): 98.6 (26 Nov 2020 05:06), Max: 99.4 (25 Nov 2020 21:41)  HR: 91 (26 Nov 2020 05:06) (86 - 104)  BP: 103/67 (26 Nov 2020 05:06) (102/66 - 110/72)  BP(mean): --  RR: 16 (26 Nov 2020 05:06) (16 - 18)  SpO2: 93% (26 Nov 2020 05:06) (90% - 94%)    PHYSICAL EXAM:  GENERAL: No acute distress, well-developed  CHEST/LUNG: CTABL, no ronchi, rales or wheezing  HEART: RRR, no MRgs  ABDOMEN: Soft, minimal incisional ttp, nondistended, +bs, liliane drains in place serosanguinous output  NEUROLOGY: A&O x 3, no focal deficits    I&O's Summary    25 Nov 2020 07:01  -  26 Nov 2020 07:00  --------------------------------------------------------  IN: 720 mL / OUT: 2478 mL / NET: -1758 mL      I&O's Detail    25 Nov 2020 07:01  -  26 Nov 2020 07:00  --------------------------------------------------------  IN:    Oral Fluid: 720 mL  Total IN: 720 mL    OUT:    Drain (mL): 148 mL    Drain (mL): 130 mL    Voided (mL): 2200 mL  Total OUT: 2478 mL    Total NET: -1758 mL        MEDICATIONS  (STANDING):  enoxaparin Injectable 40 milliGRAM(s) SubCutaneous every 12 hours  polyethylene glycol 3350 17 Gram(s) Oral daily    MEDICATIONS  (PRN):  acetaminophen   Tablet .. 1000 milliGRAM(s) Oral every 6 hours PRN Mild Pain (1 - 3)  morphine  - Injectable 4 milliGRAM(s) IV Push every 4 hours PRN Severe Pain (7 - 10)  ondansetron Injectable 4 milliGRAM(s) IV Push every 6 hours PRN Nausea and/or Vomiting  oxycodone    5 mG/acetaminophen 325 mG 1 Tablet(s) Oral every 6 hours PRN Moderate Pain (4 - 6)    LABS:                        11.4   15.83 )-----------( 226      ( 26 Nov 2020 09:52 )             34.3     11-25    141  |  106  |  9   ----------------------------<  128<H>  3.9   |  27  |  0.90    Ca    8.3<L>      25 Nov 2020 08:18      ASSESSMENT  56 y/o F POD # 2 s/p ANURADHA, BSO, abdominoplasty, sacrocolpopexy, with liposuction, tolerating reg diet, wbc 15.83 downtrending from 20.85,    PLAN  - d/c planning   - pain control, supportive care  - OOB, ambulation as tolerated  - incentive spirometery  - serial abdominal exams  - discussed with Dr. Ibarra     Surgical Team Contact Information  Spectralink: Ext: [phone] or [phone]  Pager: [phone]

## 2020-11-26 NOTE — PROGRESS NOTE ADULT - ATTENDING COMMENTS
Patient seen and examined bedside, agree with surgical SHAWNA Delavlle's note today. Doing well, appropriately. Pain with movement, OOB to restrom for TOV only. TOV UOP not yet documented, reports she voided freely and subjectively completely. No CP/SOB/dizziness, no vomiting, eating now for first time.     NAD, A&O x 3  Abd soft, ND, tender diffusely appropriately, suture line C/D/I abd binder on, JPs in, serosang drainage  No active vag bleeding  No calf tenderness bilaterally    AM labs pending    POD1 s/p combo case Plastics: abdominoplasty + Urogyn (myself) open supracervical hysterectomy, bilateral salpingectomy (no oophorectomy), sacral colpopexy, cystourethroscopy. Hemodynamically stable.  Continue lovenox daily and OOB for DVT prophylaxis while inpatient  F/U vitals, exams, clinical symptoms  PO pain control today - percocet standing, add colace and miralax for bowel regimen (rx already sent home for PO percocet)  Home meds   voiding freely, precautions given - if unable to void, check PVR and consider leg-bag trejo replacement  GI reg diet, f/u N/V and bowel function  Recs provided per plastics - continue to follow, JPs likely to be removed outpatient  Dispo inpatient today for pain control, OOB; anticipate DC home tomorrow if today's labs are normal and doing well, if pain controlled  All ques answered    WILDER García MD  [phone] (cell)
Patient seen now bedside at 2pm. Doing well, OOB, pain controlled, odalys diet no N/V. VSS, abd soft ND and incision C/D/I, no active vag bleeding and no calf tend b/l. AM CBC appropriate. DC home, precautions reviewed, reg diet, home meds, voiding freely. F/U with me in 2 weeks, she is aware. Bowel regimen and motrin/perc - has Rx already. She will f/u with Dr. Martinez for care, drain removal, etc. All ques answered.    WILDER García MD  [phone] (cell)

## 2020-11-26 NOTE — DISCHARGE NOTE PROVIDER - NSDCFUADDINST_GEN_ALL_CORE_FT
No heavy lifting, no strenuous activity. May resume regular diet. Follow up with Dr. Ibarra in her office as discussed pre-operatively. Follow up with Dr. Martinez in office.

## 2020-11-26 NOTE — DISCHARGE NOTE PROVIDER - NSDCCPTREATMENT_GEN_ALL_CORE_FT
PRINCIPAL PROCEDURE  Procedure: Open supracervical abdominal hysterectomy  Findings and Treatment:       SECONDARY PROCEDURE  Procedure: Bilateral salpingectomy  Findings and Treatment:     Procedure: Cystoscopy  Findings and Treatment:     Procedure: Sacrocolpopexy, using mesh  Findings and Treatment:

## 2020-11-26 NOTE — DISCHARGE NOTE PROVIDER - NSDCFUSCHEDAPPT_GEN_ALL_CORE_FT
JOSELITO CHIU ; 12/11/2020 ; [facility] [address] [place] [address]  JOSELITO CHIU ; 01/08/2021 ; [facility] [address] [place] [address] Mauricio

## 2020-11-26 NOTE — DISCHARGE NOTE PROVIDER - HOSPITAL COURSE
56 y/o F taken to the OR on 11/24 for elective abdominal hysterectomy, BSO, abdominoplasty with liposuction and sacrocolpopexy. PT tolerated procedure well and transferred to the surgical floor for post-op monitoring after uneventful recovery in PACU. PT with trejo d/c'd on POD # 1 passing TOV, and advanced to reg diet. PT tolerating reg diet and surgically cleared for d/c home on POD # 2, wit h WBC downtrending.

## 2020-12-11 ENCOUNTER — APPOINTMENT (OUTPATIENT)
Dept: UROGYNECOLOGY | Facility: CLINIC | Age: 55
End: 2020-12-11
Payer: COMMERCIAL

## 2020-12-11 PROCEDURE — 99024 POSTOP FOLLOW-UP VISIT: CPT

## 2020-12-11 NOTE — SUBJECTIVE
[FreeTextEntry1] : feels well overall, happy with surgery, no vag bleeding or discharge, no fever or chills [FreeTextEntry7] : improving, using motrin tylenol [FreeTextEntry6] : normal no N/V [FreeTextEntry5] : good, no leakage, no incomplete emptying, no dysuria, no gross hematuria, no flank pain [FreeTextEntry4] : intermittent constipation with incomplete evacuation, and intermittent colace use [FreeTextEntry9] : right hip skin blisters, no pus

## 2020-12-11 NOTE — DISCUSSION/SUMMARY
[Post-Op instructions given. Pt/family verbalizes understanding] : post-operative instructions were provided to the patient/family who verbalize understanding [Risks/Benefits discussed. Pt/family verbalizes understanding] : risks and benefits of the procedure were discussed with the patient/family who verbalize understanding [FreeTextEntry1] : 2 weeks postop s/p exlap THAD / BS / ASC / cysto, combo procedure with Dr. Martinez of plastics who performed an abdominoplasty. She has a left NUNO still in, incisions and and skin otherwise healing well and managed per Dr. Martinez, right-sided abrasions to skin noninfected appearing. PVR normal, precautions reviewed. RTO 6 weeks for internal vag exam. Counseled on POP reduction via surg and not necessarily freq urge management. Surg path benign. Bring return to work forms at next visit. All ques answered.

## 2020-12-11 NOTE — OBJECTIVE
[Post Void Residual ____ ml] : Post Void Residual was [unfilled] ml [Soft and Nontender] : soft and nontender [Clean, Dry, Intact] : Clean, Dry, Intact [FreeTextEntry2] : abd binder, low transverse and umbilical incisions C/D/I, right hip areas of superficial skin abrasions without induration/discharge [FreeTextEntry3] : visually externally normal

## 2020-12-19 ENCOUNTER — EMERGENCY (EMERGENCY)
Facility: HOSPITAL | Age: 55
LOS: 1 days | Discharge: ANOTHER HOSPITAL | End: 2020-12-19
Attending: EMERGENCY MEDICINE | Admitting: EMERGENCY MEDICINE
Payer: COMMERCIAL

## 2020-12-19 ENCOUNTER — NON-APPOINTMENT (OUTPATIENT)
Age: 55
End: 2020-12-19

## 2020-12-19 VITALS
HEART RATE: 94 BPM | RESPIRATION RATE: 16 BRPM | SYSTOLIC BLOOD PRESSURE: 124 MMHG | HEIGHT: 62.5 IN | DIASTOLIC BLOOD PRESSURE: 74 MMHG | WEIGHT: 164.91 LBS | OXYGEN SATURATION: 99 % | TEMPERATURE: 99 F

## 2020-12-19 DIAGNOSIS — Z90.710 ACQUIRED ABSENCE OF BOTH CERVIX AND UTERUS: Chronic | ICD-10-CM

## 2020-12-19 DIAGNOSIS — Z98.890 OTHER SPECIFIED POSTPROCEDURAL STATES: Chronic | ICD-10-CM

## 2020-12-19 DIAGNOSIS — Z90.49 ACQUIRED ABSENCE OF OTHER SPECIFIED PARTS OF DIGESTIVE TRACT: Chronic | ICD-10-CM

## 2020-12-19 LAB
ALBUMIN SERPL ELPH-MCNC: 3.5 G/DL — SIGNIFICANT CHANGE UP (ref 3.3–5)
ALP SERPL-CCNC: 58 U/L — SIGNIFICANT CHANGE UP (ref 30–120)
ALT FLD-CCNC: 21 U/L DA — SIGNIFICANT CHANGE UP (ref 10–60)
ANION GAP SERPL CALC-SCNC: 10 MMOL/L — SIGNIFICANT CHANGE UP (ref 5–17)
APPEARANCE UR: CLEAR — SIGNIFICANT CHANGE UP
APTT BLD: 31.6 SEC — SIGNIFICANT CHANGE UP (ref 27.5–35.5)
AST SERPL-CCNC: 17 U/L — SIGNIFICANT CHANGE UP (ref 10–40)
BASOPHILS # BLD AUTO: 0.05 K/UL — SIGNIFICANT CHANGE UP (ref 0–0.2)
BASOPHILS NFR BLD AUTO: 0.3 % — SIGNIFICANT CHANGE UP (ref 0–2)
BILIRUB SERPL-MCNC: 0.5 MG/DL — SIGNIFICANT CHANGE UP (ref 0.2–1.2)
BILIRUB UR-MCNC: NEGATIVE — SIGNIFICANT CHANGE UP
BUN SERPL-MCNC: 9 MG/DL — SIGNIFICANT CHANGE UP (ref 7–23)
CALCIUM SERPL-MCNC: 9.1 MG/DL — SIGNIFICANT CHANGE UP (ref 8.4–10.5)
CHLORIDE SERPL-SCNC: 105 MMOL/L — SIGNIFICANT CHANGE UP (ref 96–108)
CO2 SERPL-SCNC: 22 MMOL/L — SIGNIFICANT CHANGE UP (ref 22–31)
COLOR SPEC: YELLOW — SIGNIFICANT CHANGE UP
CREAT SERPL-MCNC: 0.66 MG/DL — SIGNIFICANT CHANGE UP (ref 0.5–1.3)
D DIMER BLD IA.RAPID-MCNC: 681 NG/ML DDU — HIGH
DIFF PNL FLD: NEGATIVE — SIGNIFICANT CHANGE UP
EOSINOPHIL # BLD AUTO: 0.72 K/UL — HIGH (ref 0–0.5)
EOSINOPHIL NFR BLD AUTO: 4.9 % — SIGNIFICANT CHANGE UP (ref 0–6)
GLUCOSE SERPL-MCNC: 107 MG/DL — HIGH (ref 70–99)
GLUCOSE UR QL: NEGATIVE MG/DL — SIGNIFICANT CHANGE UP
HCG UR QL: NEGATIVE — SIGNIFICANT CHANGE UP
HCT VFR BLD CALC: 36.9 % — SIGNIFICANT CHANGE UP (ref 34.5–45)
HGB BLD-MCNC: 12.1 G/DL — SIGNIFICANT CHANGE UP (ref 11.5–15.5)
IMM GRANULOCYTES NFR BLD AUTO: 0.3 % — SIGNIFICANT CHANGE UP (ref 0–1.5)
INR BLD: 1.08 RATIO — SIGNIFICANT CHANGE UP (ref 0.88–1.16)
KETONES UR-MCNC: NEGATIVE — SIGNIFICANT CHANGE UP
LEUKOCYTE ESTERASE UR-ACNC: NEGATIVE — SIGNIFICANT CHANGE UP
LIDOCAIN IGE QN: 81 U/L — SIGNIFICANT CHANGE UP (ref 73–393)
LYMPHOCYTES # BLD AUTO: 20.9 % — SIGNIFICANT CHANGE UP (ref 13–44)
LYMPHOCYTES # BLD AUTO: 3.09 K/UL — SIGNIFICANT CHANGE UP (ref 1–3.3)
MAGNESIUM SERPL-MCNC: 2 MG/DL — SIGNIFICANT CHANGE UP (ref 1.6–2.6)
MCHC RBC-ENTMCNC: 28.3 PG — SIGNIFICANT CHANGE UP (ref 27–34)
MCHC RBC-ENTMCNC: 32.8 GM/DL — SIGNIFICANT CHANGE UP (ref 32–36)
MCV RBC AUTO: 86.2 FL — SIGNIFICANT CHANGE UP (ref 80–100)
MONOCYTES # BLD AUTO: 1.05 K/UL — HIGH (ref 0–0.9)
MONOCYTES NFR BLD AUTO: 7.1 % — SIGNIFICANT CHANGE UP (ref 2–14)
NEUTROPHILS # BLD AUTO: 9.81 K/UL — HIGH (ref 1.8–7.4)
NEUTROPHILS NFR BLD AUTO: 66.5 % — SIGNIFICANT CHANGE UP (ref 43–77)
NITRITE UR-MCNC: NEGATIVE — SIGNIFICANT CHANGE UP
NRBC # BLD: 0 /100 WBCS — SIGNIFICANT CHANGE UP (ref 0–0)
PH UR: 6.5 — SIGNIFICANT CHANGE UP (ref 5–8)
PLATELET # BLD AUTO: 229 K/UL — SIGNIFICANT CHANGE UP (ref 150–400)
POTASSIUM SERPL-MCNC: 3.8 MMOL/L — SIGNIFICANT CHANGE UP (ref 3.5–5.3)
POTASSIUM SERPL-SCNC: 3.8 MMOL/L — SIGNIFICANT CHANGE UP (ref 3.5–5.3)
PROT SERPL-MCNC: 7.4 G/DL — SIGNIFICANT CHANGE UP (ref 6–8.3)
PROT UR-MCNC: NEGATIVE MG/DL — SIGNIFICANT CHANGE UP
PROTHROM AB SERPL-ACNC: 13 SEC — SIGNIFICANT CHANGE UP (ref 10.6–13.6)
RBC # BLD: 4.28 M/UL — SIGNIFICANT CHANGE UP (ref 3.8–5.2)
RBC # FLD: 13.2 % — SIGNIFICANT CHANGE UP (ref 10.3–14.5)
SARS-COV-2 RNA SPEC QL NAA+PROBE: SIGNIFICANT CHANGE UP
SODIUM SERPL-SCNC: 137 MMOL/L — SIGNIFICANT CHANGE UP (ref 135–145)
SP GR SPEC: 1 — LOW (ref 1.01–1.02)
UROBILINOGEN FLD QL: NEGATIVE MG/DL — SIGNIFICANT CHANGE UP
WBC # BLD: 14.76 K/UL — HIGH (ref 3.8–10.5)
WBC # FLD AUTO: 14.76 K/UL — HIGH (ref 3.8–10.5)

## 2020-12-19 PROCEDURE — 93010 ELECTROCARDIOGRAM REPORT: CPT

## 2020-12-19 PROCEDURE — 99285 EMERGENCY DEPT VISIT HI MDM: CPT

## 2020-12-19 PROCEDURE — 71275 CT ANGIOGRAPHY CHEST: CPT | Mod: 26

## 2020-12-19 PROCEDURE — 74177 CT ABD & PELVIS W/CONTRAST: CPT | Mod: 26

## 2020-12-19 RX ORDER — HEPARIN SODIUM 5000 [USP'U]/ML
3000 INJECTION INTRAVENOUS; SUBCUTANEOUS EVERY 6 HOURS
Refills: 0 | Status: DISCONTINUED | OUTPATIENT
Start: 2020-12-19 | End: 2020-12-23

## 2020-12-19 RX ORDER — CHOLECALCIFEROL (VITAMIN D3) 125 MCG
0 CAPSULE ORAL
Qty: 0 | Refills: 0 | DISCHARGE

## 2020-12-19 RX ORDER — ONDANSETRON 8 MG/1
4 TABLET, FILM COATED ORAL ONCE
Refills: 0 | Status: COMPLETED | OUTPATIENT
Start: 2020-12-19 | End: 2020-12-19

## 2020-12-19 RX ORDER — MORPHINE SULFATE 50 MG/1
4 CAPSULE, EXTENDED RELEASE ORAL ONCE
Refills: 0 | Status: DISCONTINUED | OUTPATIENT
Start: 2020-12-19 | End: 2020-12-19

## 2020-12-19 RX ORDER — HEPARIN SODIUM 5000 [USP'U]/ML
6000 INJECTION INTRAVENOUS; SUBCUTANEOUS EVERY 6 HOURS
Refills: 0 | Status: DISCONTINUED | OUTPATIENT
Start: 2020-12-19 | End: 2020-12-23

## 2020-12-19 RX ORDER — HEPARIN SODIUM 5000 [USP'U]/ML
INJECTION INTRAVENOUS; SUBCUTANEOUS
Qty: 25000 | Refills: 0 | Status: DISCONTINUED | OUTPATIENT
Start: 2020-12-19 | End: 2020-12-23

## 2020-12-19 RX ORDER — HEPARIN SODIUM 5000 [USP'U]/ML
6000 INJECTION INTRAVENOUS; SUBCUTANEOUS ONCE
Refills: 0 | Status: COMPLETED | OUTPATIENT
Start: 2020-12-19 | End: 2020-12-19

## 2020-12-19 RX ADMIN — MORPHINE SULFATE 4 MILLIGRAM(S): 50 CAPSULE, EXTENDED RELEASE ORAL at 19:30

## 2020-12-19 RX ADMIN — MORPHINE SULFATE 4 MILLIGRAM(S): 50 CAPSULE, EXTENDED RELEASE ORAL at 21:24

## 2020-12-19 RX ADMIN — HEPARIN SODIUM 1300 UNIT(S)/HR: 5000 INJECTION INTRAVENOUS; SUBCUTANEOUS at 21:31

## 2020-12-19 RX ADMIN — ONDANSETRON 4 MILLIGRAM(S): 8 TABLET, FILM COATED ORAL at 21:24

## 2020-12-19 RX ADMIN — HEPARIN SODIUM 5000 UNIT(S): 5000 INJECTION INTRAVENOUS; SUBCUTANEOUS at 21:29

## 2020-12-19 NOTE — ED ADULT NURSE NOTE - OBJECTIVE STATEMENT
"I had surgery 31/2 weeks ago & last night developed rt back pain that radiated up to shoulder. They told me to come here for a CT scan" "I had surgery 31/2 weeks ago & last night developed rt back pain that radiated up to shoulder. They told me to come here for a CT scan", pt is wearing abd binder, wound healing

## 2020-12-19 NOTE — ED PROVIDER NOTE - PSH
S/P abdominoplasty    S/P laparoscopic appendectomy  2010  S/P laparoscopic cholecystectomy  2012  S/P sacrocolpopexy    S/P ANURADHA-BSO (total abdominal hysterectomy and bilateral salpingo-oophorectomy)

## 2020-12-19 NOTE — ED PROVIDER NOTE - NS_ ATTENDINGSCRIBEDETAILS _ED_A_ED_FT
Bob Figueroa MD - The scribe's documentation has been prepared under my direction and personally reviewed by me in its entirety. I confirm that the note above accurately reflects all work, treatment, procedures, and medical decision making performed by me.

## 2020-12-19 NOTE — ED ADULT TRIAGE NOTE - CHIEF COMPLAINT QUOTE
"I had surgery 31/2 weeks ago & last night developed rt back pain that radiated up to shoulder. They told me to come here for a CT scan"

## 2020-12-19 NOTE — ED PROVIDER NOTE - PMH
Bladder prolapse, female, acquired    Carpal tunnel syndrome    IBS (irritable bowel syndrome)    Raynaud's phenomenon    Right shoulder pain  on mobic  Uterine prolapse

## 2020-12-19 NOTE — ED PROVIDER NOTE - OBJECTIVE STATEMENT
54 y/o F with PMHx of IBS, s/p cholecystectomy, s/p appendectomy, and s/p ANURADHA, BSO, abdominoplasty, lipo, and sacrocolpopexy with OBGYN Dr. Jadyn García and Plastic Surgeon Dr. Pramod Martinez at [facility] on 11/24 presents ambulatory to the ED c/o +R lower back pain radiating to R shoulder and R chest beginning last night. Pain worsens with deep breathing. Notes associated +SOB. No nausea, vomiting, diarrhea, difficulty urinating, vaginal bleeding, or fever. Pt called Dr. Martinez who referred pt to the ED for CT r/o PE. Had seen Dr. Martinez yesterday before onset of back pain and had fluid drained from incision site. Took at 5 PM tonight without relief. Never smoker. NKDA. PCP: Dr. Virgilio Garcia.

## 2020-12-19 NOTE — ED PROVIDER NOTE - PROGRESS NOTE DETAILS
CT results noted. Patient's surgeon called to discuss anticoagulation CT results noted. Patient's surgeon called to discuss anticoagulation. As per covering surgeon, no contraindication to anticoagulation

## 2020-12-20 ENCOUNTER — INPATIENT (INPATIENT)
Facility: HOSPITAL | Age: 55
LOS: 1 days | Discharge: HOME | DRG: 176 | End: 2020-12-22
Attending: STUDENT IN AN ORGANIZED HEALTH CARE EDUCATION/TRAINING PROGRAM | Admitting: HOSPITALIST
Payer: COMMERCIAL

## 2020-12-20 VITALS
SYSTOLIC BLOOD PRESSURE: 104 MMHG | HEART RATE: 70 BPM | OXYGEN SATURATION: 99 % | WEIGHT: 164.91 LBS | TEMPERATURE: 98 F | HEIGHT: 62.5 IN | RESPIRATION RATE: 18 BRPM | DIASTOLIC BLOOD PRESSURE: 62 MMHG

## 2020-12-20 VITALS
OXYGEN SATURATION: 98 % | DIASTOLIC BLOOD PRESSURE: 88 MMHG | HEART RATE: 77 BPM | RESPIRATION RATE: 18 BRPM | SYSTOLIC BLOOD PRESSURE: 115 MMHG

## 2020-12-20 DIAGNOSIS — Z98.890 OTHER SPECIFIED POSTPROCEDURAL STATES: Chronic | ICD-10-CM

## 2020-12-20 DIAGNOSIS — Z29.9 ENCOUNTER FOR PROPHYLACTIC MEASURES, UNSPECIFIED: ICD-10-CM

## 2020-12-20 DIAGNOSIS — K59.00 CONSTIPATION, UNSPECIFIED: ICD-10-CM

## 2020-12-20 DIAGNOSIS — I26.99 OTHER PULMONARY EMBOLISM WITHOUT ACUTE COR PULMONALE: ICD-10-CM

## 2020-12-20 DIAGNOSIS — Z90.49 ACQUIRED ABSENCE OF OTHER SPECIFIED PARTS OF DIGESTIVE TRACT: Chronic | ICD-10-CM

## 2020-12-20 DIAGNOSIS — R09.89 OTHER SPECIFIED SYMPTOMS AND SIGNS INVOLVING THE CIRCULATORY AND RESPIRATORY SYSTEMS: ICD-10-CM

## 2020-12-20 DIAGNOSIS — Z90.710 ACQUIRED ABSENCE OF BOTH CERVIX AND UTERUS: Chronic | ICD-10-CM

## 2020-12-20 DIAGNOSIS — D72.829 ELEVATED WHITE BLOOD CELL COUNT, UNSPECIFIED: ICD-10-CM

## 2020-12-20 DIAGNOSIS — T88.8XXA OTHER SPECIFIED COMPLICATIONS OF SURGICAL AND MEDICAL CARE, NOT ELSEWHERE CLASSIFIED, INITIAL ENCOUNTER: ICD-10-CM

## 2020-12-20 LAB
ANION GAP SERPL CALC-SCNC: 11 MMOL/L — SIGNIFICANT CHANGE UP (ref 5–17)
APTT BLD: 118.6 SEC — HIGH (ref 27.5–35.5)
APTT BLD: 48.5 SEC — HIGH (ref 27.5–35.5)
APTT BLD: >200 SEC — CRITICAL HIGH (ref 27.5–35.5)
BASOPHILS # BLD AUTO: 0.03 K/UL — SIGNIFICANT CHANGE UP (ref 0–0.2)
BASOPHILS NFR BLD AUTO: 0.2 % — SIGNIFICANT CHANGE UP (ref 0–2)
BUN SERPL-MCNC: 8 MG/DL — SIGNIFICANT CHANGE UP (ref 7–23)
CALCIUM SERPL-MCNC: 9.3 MG/DL — SIGNIFICANT CHANGE UP (ref 8.4–10.5)
CHLORIDE SERPL-SCNC: 106 MMOL/L — SIGNIFICANT CHANGE UP (ref 96–108)
CO2 SERPL-SCNC: 23 MMOL/L — SIGNIFICANT CHANGE UP (ref 22–31)
CREAT SERPL-MCNC: 0.64 MG/DL — SIGNIFICANT CHANGE UP (ref 0.5–1.3)
EOSINOPHIL # BLD AUTO: 0 K/UL — SIGNIFICANT CHANGE UP (ref 0–0.5)
EOSINOPHIL NFR BLD AUTO: 0 % — SIGNIFICANT CHANGE UP (ref 0–6)
GLUCOSE SERPL-MCNC: 155 MG/DL — HIGH (ref 70–99)
HCT VFR BLD CALC: 38.3 % — SIGNIFICANT CHANGE UP (ref 34.5–45)
HGB BLD-MCNC: 12.6 G/DL — SIGNIFICANT CHANGE UP (ref 11.5–15.5)
IMM GRANULOCYTES NFR BLD AUTO: 0.5 % — SIGNIFICANT CHANGE UP (ref 0–1.5)
INR BLD: 1.24 RATIO — HIGH (ref 0.88–1.16)
LYMPHOCYTES # BLD AUTO: 1.3 K/UL — SIGNIFICANT CHANGE UP (ref 1–3.3)
LYMPHOCYTES # BLD AUTO: 7.9 % — LOW (ref 13–44)
MAGNESIUM SERPL-MCNC: 2.2 MG/DL — SIGNIFICANT CHANGE UP (ref 1.6–2.6)
MCHC RBC-ENTMCNC: 28.8 PG — SIGNIFICANT CHANGE UP (ref 27–34)
MCHC RBC-ENTMCNC: 32.9 GM/DL — SIGNIFICANT CHANGE UP (ref 32–36)
MCV RBC AUTO: 87.6 FL — SIGNIFICANT CHANGE UP (ref 80–100)
MONOCYTES # BLD AUTO: 0.69 K/UL — SIGNIFICANT CHANGE UP (ref 0–0.9)
MONOCYTES NFR BLD AUTO: 4.2 % — SIGNIFICANT CHANGE UP (ref 2–14)
NEUTROPHILS # BLD AUTO: 14.45 K/UL — HIGH (ref 1.8–7.4)
NEUTROPHILS NFR BLD AUTO: 87.2 % — HIGH (ref 43–77)
NRBC # BLD: 0 /100 WBCS — SIGNIFICANT CHANGE UP (ref 0–0)
NT-PROBNP SERPL-SCNC: 101 PG/ML — SIGNIFICANT CHANGE UP (ref 0–300)
PHOSPHATE SERPL-MCNC: 3.5 MG/DL — SIGNIFICANT CHANGE UP (ref 2.5–4.5)
PLATELET # BLD AUTO: 224 K/UL — SIGNIFICANT CHANGE UP (ref 150–400)
POTASSIUM SERPL-MCNC: 4.2 MMOL/L — SIGNIFICANT CHANGE UP (ref 3.5–5.3)
POTASSIUM SERPL-SCNC: 4.2 MMOL/L — SIGNIFICANT CHANGE UP (ref 3.5–5.3)
PROTHROM AB SERPL-ACNC: 14.7 SEC — HIGH (ref 10.6–13.6)
RBC # BLD: 4.37 M/UL — SIGNIFICANT CHANGE UP (ref 3.8–5.2)
RBC # FLD: 13.4 % — SIGNIFICANT CHANGE UP (ref 10.3–14.5)
SODIUM SERPL-SCNC: 140 MMOL/L — SIGNIFICANT CHANGE UP (ref 135–145)
TROPONIN T, HIGH SENSITIVITY RESULT: <6 NG/L — SIGNIFICANT CHANGE UP (ref 0–51)
WBC # BLD: 16.55 K/UL — HIGH (ref 3.8–10.5)
WBC # FLD AUTO: 16.55 K/UL — HIGH (ref 3.8–10.5)

## 2020-12-20 PROCEDURE — 99285 EMERGENCY DEPT VISIT HI MDM: CPT

## 2020-12-20 PROCEDURE — 12345: CPT | Mod: NC

## 2020-12-20 PROCEDURE — 93970 EXTREMITY STUDY: CPT | Mod: 26

## 2020-12-20 PROCEDURE — 99223 1ST HOSP IP/OBS HIGH 75: CPT

## 2020-12-20 RX ORDER — APIXABAN 2.5 MG/1
10 TABLET, FILM COATED ORAL EVERY 12 HOURS
Refills: 0 | Status: DISCONTINUED | OUTPATIENT
Start: 2020-12-20 | End: 2020-12-22

## 2020-12-20 RX ORDER — ONDANSETRON 8 MG/1
4 TABLET, FILM COATED ORAL ONCE
Refills: 0 | Status: COMPLETED | OUTPATIENT
Start: 2020-12-20 | End: 2020-12-20

## 2020-12-20 RX ORDER — TRAMADOL HYDROCHLORIDE 50 MG/1
25 TABLET ORAL ONCE
Refills: 0 | Status: DISCONTINUED | OUTPATIENT
Start: 2020-12-20 | End: 2020-12-20

## 2020-12-20 RX ORDER — ACETAMINOPHEN 500 MG
650 TABLET ORAL EVERY 6 HOURS
Refills: 0 | Status: DISCONTINUED | OUTPATIENT
Start: 2020-12-20 | End: 2020-12-21

## 2020-12-20 RX ORDER — POLYETHYLENE GLYCOL 3350 17 G/17G
17 POWDER, FOR SOLUTION ORAL DAILY
Refills: 0 | Status: DISCONTINUED | OUTPATIENT
Start: 2020-12-20 | End: 2020-12-22

## 2020-12-20 RX ORDER — DOCUSATE SODIUM 100 MG
2 CAPSULE ORAL
Qty: 0 | Refills: 0 | DISCHARGE

## 2020-12-20 RX ORDER — HEPARIN SODIUM 5000 [USP'U]/ML
INJECTION INTRAVENOUS; SUBCUTANEOUS
Qty: 25000 | Refills: 0 | Status: DISCONTINUED | OUTPATIENT
Start: 2020-12-20 | End: 2020-12-20

## 2020-12-20 RX ORDER — MORPHINE SULFATE 50 MG/1
4 CAPSULE, EXTENDED RELEASE ORAL ONCE
Refills: 0 | Status: DISCONTINUED | OUTPATIENT
Start: 2020-12-20 | End: 2020-12-20

## 2020-12-20 RX ORDER — ALUMINUM ZIRCONIUM TRICHLOROHYDREX GLY 0.2 G/G
1 STICK TOPICAL
Qty: 0 | Refills: 0 | DISCHARGE

## 2020-12-20 RX ORDER — OXYCODONE HYDROCHLORIDE 5 MG/1
5 TABLET ORAL EVERY 6 HOURS
Refills: 0 | Status: DISCONTINUED | OUTPATIENT
Start: 2020-12-20 | End: 2020-12-22

## 2020-12-20 RX ORDER — SENNA PLUS 8.6 MG/1
2 TABLET ORAL AT BEDTIME
Refills: 0 | Status: DISCONTINUED | OUTPATIENT
Start: 2020-12-20 | End: 2020-12-22

## 2020-12-20 RX ORDER — LACTOBACILLUS ACIDOPHILUS 100MM CELL
1 CAPSULE ORAL DAILY
Refills: 0 | Status: DISCONTINUED | OUTPATIENT
Start: 2020-12-20 | End: 2020-12-22

## 2020-12-20 RX ADMIN — APIXABAN 10 MILLIGRAM(S): 2.5 TABLET, FILM COATED ORAL at 18:59

## 2020-12-20 RX ADMIN — HEPARIN SODIUM 900 UNIT(S)/HR: 5000 INJECTION INTRAVENOUS; SUBCUTANEOUS at 14:26

## 2020-12-20 RX ADMIN — SENNA PLUS 2 TABLET(S): 8.6 TABLET ORAL at 21:26

## 2020-12-20 RX ADMIN — ONDANSETRON 4 MILLIGRAM(S): 8 TABLET, FILM COATED ORAL at 23:25

## 2020-12-20 RX ADMIN — HEPARIN SODIUM 1100 UNIT(S)/HR: 5000 INJECTION INTRAVENOUS; SUBCUTANEOUS at 06:20

## 2020-12-20 RX ADMIN — OXYCODONE HYDROCHLORIDE 5 MILLIGRAM(S): 5 TABLET ORAL at 06:35

## 2020-12-20 RX ADMIN — OXYCODONE HYDROCHLORIDE 5 MILLIGRAM(S): 5 TABLET ORAL at 07:05

## 2020-12-20 RX ADMIN — OXYCODONE HYDROCHLORIDE 5 MILLIGRAM(S): 5 TABLET ORAL at 23:25

## 2020-12-20 RX ADMIN — ONDANSETRON 4 MILLIGRAM(S): 8 TABLET, FILM COATED ORAL at 06:35

## 2020-12-20 RX ADMIN — Medication 1 TABLET(S): at 12:52

## 2020-12-20 RX ADMIN — ONDANSETRON 4 MILLIGRAM(S): 8 TABLET, FILM COATED ORAL at 02:55

## 2020-12-20 RX ADMIN — POLYETHYLENE GLYCOL 3350 17 GRAM(S): 17 POWDER, FOR SOLUTION ORAL at 12:51

## 2020-12-20 RX ADMIN — MORPHINE SULFATE 4 MILLIGRAM(S): 50 CAPSULE, EXTENDED RELEASE ORAL at 00:20

## 2020-12-20 RX ADMIN — Medication 650 MILLIGRAM(S): at 18:59

## 2020-12-20 RX ADMIN — TRAMADOL HYDROCHLORIDE 25 MILLIGRAM(S): 50 TABLET ORAL at 21:48

## 2020-12-20 RX ADMIN — HEPARIN SODIUM 1300 UNIT(S)/HR: 5000 INJECTION INTRAVENOUS; SUBCUTANEOUS at 02:11

## 2020-12-20 RX ADMIN — TRAMADOL HYDROCHLORIDE 25 MILLIGRAM(S): 50 TABLET ORAL at 23:07

## 2020-12-20 RX ADMIN — HEPARIN SODIUM 0 UNIT(S)/HR: 5000 INJECTION INTRAVENOUS; SUBCUTANEOUS at 05:11

## 2020-12-20 NOTE — PROGRESS NOTE ADULT - SUBJECTIVE AND OBJECTIVE BOX
Resting OOB in chair.  States pain slightly improved as well as SOB  VSS - afebrile    PE:  suture lines intact  no cellulitis  palpable small seroma above pubis  no tenderness  no ecchymosis  resolving swelling    CT noted   CBC noted  Disc'd with Hospitalist  Awaiting switch to Oral anticoagulation, as well as Duplex scan for w/u of possible source of clot  Will continue to follow

## 2020-12-20 NOTE — PROGRESS NOTE ADULT - SUBJECTIVE AND OBJECTIVE BOX
Urogyn Attd:    Patient seen at 3 pm today. Chart reviewed. She is 1 month postop from abdominal supracervical hysterectomy, bilateral salpingectomy, sacral colpopexy, and cystourethroscopy by myself + abdominoplasty per Plastics. Her postop course until this point has been appropriate and uneventful. She has been OOB. She presented with acute SOB and chest pain and was found to have a RLL PE, currently on heparin drip and starting Eliquis tonight per primary medical team. She is already feeling better on the heparin. She is aware she will be on AC for months, and have an outpatient heme eval in future for coagulopathy. She reports her mother had blood clots and was on AC, however unsure if there is a genetic or inherited thrombophilia. I will continue to follow her progress. Precautions reviewed.     Panda García MD  [phone] (cell)

## 2020-12-20 NOTE — H&P ADULT - PROBLEM SELECTOR PLAN 1
acute SOB and R lower back and R shoulder/anterior chest pain, found with RLL PE with pulm infarct, in setting of recent surgery. BP near baseline, not hypoxic.    - CP pain and SOB improved  - c/w heparin gtt for now, will need to transition to DOAC in AM   - no e/o R heart strain on CT, but will check EKG, trop and BNP   - check TTE   - currently not hypoxic, monitor O2 sat, supplemental O2 as needed

## 2020-12-20 NOTE — H&P ADULT - PROBLEM SELECTOR PLAN 3
c/w senna and Miralax daily for now WBC of 15-16, unclear etiology - ?reactive to PE vs infectious; pt denying fevers or any other localizing symptoms; imaging showing fluid collection at surgical site likely degrading hematoma vs seroma  - f/u with plastics   - defer abx for now and monitor CBC

## 2020-12-20 NOTE — H&P ADULT - PROBLEM SELECTOR PLAN 2
CT A/P noted with 2.6 cm fluid collection in the anterior abdominal wall, likely degenerating hematoma or organized seroma  - low suspicion for infection given no abdominal pain and benign exam   - inpt vs outpt f/u with plastics (Dr. Pramod Martinez) to review imaging and evaluate  further   - c/w abdominal binder   -

## 2020-12-20 NOTE — ED PROVIDER NOTE - ATTENDING CONTRIBUTION TO CARE
attending Daryn: 55yF h/o IBS s/p cholecystectomy, s/p appendectomy, and s/p ANURADHA, BSO, abdominoplasty, lipo, and sacrocolpopexy with OBGYN Dr. García and Plastic Surgeon Dr. Martinez at [facility] on 11/24 transferred from OSH for PE. Presented to OSH for R sided chest pain and SOB x 2 days. Heparin gtt started at OSH. HD stable on arrival. Will review outside hospital records, arrange for admission

## 2020-12-20 NOTE — H&P ADULT - ASSESSMENT
55F with PMHof IBS, Reynaud's, s/p ANURADHA/BSO with abdominoplasty, lipo, and sacrocolpopexy on 11/24/20 p/w SOB and chest pain, found to have RLL PE.

## 2020-12-20 NOTE — H&P ADULT - NSICDXPASTSURGICALHX_GEN_ALL_CORE_FT
PAST SURGICAL HISTORY:  S/P abdominoplasty     S/P laparoscopic appendectomy 2010    S/P laparoscopic cholecystectomy 2012    S/P sacrocolpopexy     S/P ANURADHA-BSO (total abdominal hysterectomy and bilateral salpingo-oophorectomy)

## 2020-12-20 NOTE — H&P ADULT - NSHPLABSRESULTS_GEN_ALL_CORE
Labs, imaging and EKG personally reviewed and interpreted by me.                           12.1   14.76 )-----------( 229      ( 19 Dec 2020 19:35 )             36.9         137  |  105  |  9   ----------------------------<  107<H>  3.8   |  22  |  0.66    Ca    9.1      19 Dec 2020 19:35  Mg     2.0         TPro  7.4  /  Alb  3.5  /  TBili  0.5  /  DBili  x   /  AST  17  /  ALT  21  /  AlkPhos  58          PT/INR - ( 19 Dec 2020 19:35 )   PT: 13.0 sec;   INR: 1.08 ratio         PTT - ( 19 Dec 2020 19:35 )  PTT:31.6 sec    Urinalysis Basic - ( 19 Dec 2020 19:15 )    Color: Yellow / Appearance: Clear / S.005 / pH: x  Gluc: x / Ketone: Negative  / Bili: Negative / Urobili: Negative mg/dL   Blood: x / Protein: Negative mg/dL / Nitrite: Negative   Leuk Esterase: Negative / RBC: x / WBC x   Sq Epi: x / Non Sq Epi: x / Bacteria: x      < from: CT Angio Chest w/ IV Cont (20 @ 20:28) >    IMPRESSION:    1. Acute segmental and subsegmental pulmonary emboli in the right lower lobe with developing pulmonary infarct in the posterior basilar segment.  2. No evidence of right heart strain.  3. Surgical changes from recent abdominoplasty and supracervical hysterectomy. There is an additional 2.6 cm organized fluid collection in the anterior abdominal wall inseparable from the left rectus abdominis, probably represents a degenerating hematoma or organized seroma. Differential also includes abdominal wall endometriosis in the appropriate clinical setting.    < end of copied text >

## 2020-12-20 NOTE — ED PROVIDER NOTE - OBJECTIVE STATEMENT
55 F hx of IBS s/p cholecystectomy, s/p appendectomy, and s/p ANURADHA, BSO, abdominoplasty, lipo, and sacrocolpopexy with OBGYN Dr. Jadyn García and Plastic Surgeon Dr. Pramod Martinez at [facility] on 11/24 presenting as a transfer from [facility] where she went for R chest pain and SoB that started yesterday. Dx with a PE there and transferred to [facility] at pt requested accepted by Dr Neville. States her abd pain and wounds have been controlled and without significant drainage.

## 2020-12-20 NOTE — H&P ADULT - NSHPPHYSICALEXAM_GEN_ALL_CORE
Vital Signs Last 24 Hrs  T(C): 36.4 (20 Dec 2020 03:40), Max: 37 (19 Dec 2020 18:45)  T(F): 97.6 (20 Dec 2020 03:40), Max: 98.6 (19 Dec 2020 18:45)  HR: 75 (20 Dec 2020 03:40) (70 - 94)  BP: 106/75 (20 Dec 2020 03:40) (92/52 - 124/74)  BP(mean): --  RR: 20 (20 Dec 2020 03:40) (11 - 24)  SpO2: 98% (20 Dec 2020 03:40) (98% - 100%)    PHYSICAL EXAM:  GENERAL: NAD, well-developed  HEAD:  Atraumatic, normocephalic  EYES: EOMI, conjunctiva and sclera clear  NECK: Supple, no JVD  CHEST/LUNG: Clear to auscultation bilaterally; no wheezing or rales  HEART: Regular rate and rhythm; no murmurs  ABDOMEN: Soft, +abdominal binder, nondistended; bowel sounds present  EXTREMITIES:  2+ Peripheral Pulses, no edema  PSYCH: calm affect, not anxious  NEUROLOGY: non-focal, AAOx3  SKIN: No rashes or lesions  MUSCULOSKELETAL: no back pain, moving all extremities

## 2020-12-20 NOTE — ED ADULT NURSE NOTE - OBJECTIVE STATEMENT
Pt is a 55 yr old female with pmh of Raynaud's, IBS, and hysterectomy with abdominal binder in place transferred from [place] ED for right PE. Pt states on 12/18 she noticed she had back pain on the right side and then neck pain later that day. Pt notes that it worsened by 12/19 and she became sob/couldn't take a deep breath so she came into the ED. Pt was given a CT with contrast there which showed the PE. Heparin bolus of 6000 units was given at 2130 on 12/19/20 and heparin infusion was started at 13 units/kg/hr. 4 mg morphine given for pain. Pt transferred here and per EMS was given Zofran in route for nausea and placed on 2L NC for comfort. 20 gauge placed at [place]. Per heparin nomogram and pt weight of 72.6 kg ( standing at [facility]) - heparin infusion at 13 units is correct and will be maintained. Pt is a/o x 3- vitals showing soft pressures at 94/80 (normal for pt is low 100's), nornmal heart rate and oxygen saturation maintaining at 100% on RA. Pt remains comfortable and has decreased pain since morphine injection. Pt to be admitted.

## 2020-12-20 NOTE — ED ADULT NURSE NOTE - NS ED NURSE PATIENT LEFT UNIT TIME
Xenograft Text: The defect edges were debeveled with a #15 scalpel blade.  Given the location of the defect, shape of the defect and the proximity to free margins a xenograft was deemed most appropriate.  The graft was then trimmed to fit the size of the defect.  The graft was then placed in the primary defect and oriented appropriately. 03:27

## 2020-12-20 NOTE — PROGRESS NOTE ADULT - SUBJECTIVE AND OBJECTIVE BOX
Patient is a 55y old  Female who presents with a chief complaint of SOB (20 Dec 2020 10:14)      SUBJECTIVE / OVERNIGHT EVENTS: No overnight events. Feels much better. Denies sob, SAys back pain has now resolved. Denies cp, palpitaitons, dizziness, n/v, leg pain or swelling, f/c, .        ADDITIONAL REVIEW OF SYSTEMS: Negative except for above    MEDICATIONS  (STANDING):  apixaban 10 milliGRAM(s) Oral every 12 hours  heparin  Infusion.  Unit(s)/Hr (13 mL/Hr) IV Continuous <Continuous>  lactobacillus acidophilus 1 Tablet(s) Oral daily  polyethylene glycol 3350 17 Gram(s) Oral daily  senna 2 Tablet(s) Oral at bedtime    MEDICATIONS  (PRN):  acetaminophen   Tablet .. 650 milliGRAM(s) Oral every 6 hours PRN Temp greater or equal to 38C (100.4F), Mild Pain (1 - 3)  oxyCODONE    IR 5 milliGRAM(s) Oral every 6 hours PRN moderate to severe pain      CAPILLARY BLOOD GLUCOSE        I&O's Summary    20 Dec 2020 07:01  -  20 Dec 2020 12:00  --------------------------------------------------------  IN: 500 mL / OUT: 0 mL / NET: 500 mL        PHYSICAL EXAM:  Vital Signs Last 24 Hrs  T(C): 36.4 (20 Dec 2020 03:40), Max: 37 (19 Dec 2020 18:45)  T(F): 97.6 (20 Dec 2020 03:40), Max: 98.6 (19 Dec 2020 18:45)  HR: 75 (20 Dec 2020 03:40) (70 - 94)  BP: 106/75 (20 Dec 2020 03:40) (92/52 - 124/74)  BP(mean): --  RR: 20 (20 Dec 2020 03:40) (11 - 24)  SpO2: 98% (20 Dec 2020 03:40) (98% - 100%)    PHYSICAL EXAM:  GENERAL: NAD, well-developed on RA  HEAD:  Atraumatic, Normocephalic  EYES:  conjunctiva and sclera clear  NECK: Supple, No JVD  CHEST/LUNG: Clear to auscultation bilaterally; No wheeze  HEART: Regular rate and rhythm; No murmurs, rubs, or gallops  ABDOMEN: Soft, Nontender, Nondistended; Bowel sounds present  EXTREMITIES:  2+ Peripheral Pulses, No clubbing, cyanosis, or edema, no calf swelling or tenderness  PSYCH: AAOx3  NEUROLOGY: non-focal  SKIN: well healed abdominal surgical scar without erythema or drainage      LABS:                        12.6   16.55 )-----------( 224      ( 20 Dec 2020 04:24 )             38.3     12-    140  |  106  |  8   ----------------------------<  155<H>  4.2   |  23  |  0.64    Ca    9.3      20 Dec 2020 04:24  Phos  3.5     12-  Mg     2.2     -    TPro  7.4  /  Alb  3.5  /  TBili  0.5  /  DBili  x   /  AST  17  /  ALT  21  /  AlkPhos  58  12-19    PT/INR - ( 20 Dec 2020 04:24 )   PT: 14.7 sec;   INR: 1.24 ratio         PTT - ( 20 Dec 2020 04:24 )  PTT:>200.0 sec      Urinalysis Basic - ( 19 Dec 2020 19:15 )    Color: Yellow / Appearance: Clear / S.005 / pH: x  Gluc: x / Ketone: Negative  / Bili: Negative / Urobili: Negative mg/dL   Blood: x / Protein: Negative mg/dL / Nitrite: Negative   Leuk Esterase: Negative / RBC: x / WBC x   Sq Epi: x / Non Sq Epi: x / Bacteria: x          RADIOLOGY & ADDITIONAL TESTS:    Imaging Personally Reviewed:    Electrocardiogram Personally Reviewed:    COORDINATION OF CARE:  Care Discussed with Consultants/Other Providers [Y/N]:  Prior or Outpatient Records Reviewed [Y/N]:     Patient is a 55y old  Female who presents with a chief complaint of SOB (20 Dec 2020 10:14)      SUBJECTIVE / OVERNIGHT EVENTS: No overnight events. Feels much better. Denies sob, SAys back pain has now resolved. Denies cp, palpitaitons, dizziness, n/v, leg pain or swelling, f/c, .  denies bleeding, melena      ADDITIONAL REVIEW OF SYSTEMS: Negative except for above    MEDICATIONS  (STANDING):  apixaban 10 milliGRAM(s) Oral every 12 hours  heparin  Infusion.  Unit(s)/Hr (13 mL/Hr) IV Continuous <Continuous>  lactobacillus acidophilus 1 Tablet(s) Oral daily  polyethylene glycol 3350 17 Gram(s) Oral daily  senna 2 Tablet(s) Oral at bedtime    MEDICATIONS  (PRN):  acetaminophen   Tablet .. 650 milliGRAM(s) Oral every 6 hours PRN Temp greater or equal to 38C (100.4F), Mild Pain (1 - 3)  oxyCODONE    IR 5 milliGRAM(s) Oral every 6 hours PRN moderate to severe pain      CAPILLARY BLOOD GLUCOSE        I&O's Summary    20 Dec 2020 07:01  -  20 Dec 2020 12:00  --------------------------------------------------------  IN: 500 mL / OUT: 0 mL / NET: 500 mL        PHYSICAL EXAM:  Vital Signs Last 24 Hrs  T(C): 36.4 (20 Dec 2020 03:40), Max: 37 (19 Dec 2020 18:45)  T(F): 97.6 (20 Dec 2020 03:40), Max: 98.6 (19 Dec 2020 18:45)  HR: 75 (20 Dec 2020 03:40) (70 - 94)  BP: 106/75 (20 Dec 2020 03:40) (92/52 - 124/74)  BP(mean): --  RR: 20 (20 Dec 2020 03:40) (11 - 24)  SpO2: 98% (20 Dec 2020 03:40) (98% - 100%)    PHYSICAL EXAM:  GENERAL: NAD, well-developed on RA  HEAD:  Atraumatic, Normocephalic  EYES:  conjunctiva and sclera clear  NECK: Supple, No JVD  CHEST/LUNG: Clear to auscultation bilaterally; No wheeze  HEART: Regular rate and rhythm; No murmurs, rubs, or gallops  ABDOMEN: Soft, Nontender, Nondistended; Bowel sounds present  EXTREMITIES:  2+ Peripheral Pulses, No clubbing, cyanosis, or edema, no calf swelling or tenderness  PSYCH: AAOx3  NEUROLOGY: non-focal  SKIN: well healed abdominal surgical scar without erythema or drainage      LABS:                        12.6   16.55 )-----------( 224      ( 20 Dec 2020 04:24 )             38.3     12-    140  |  106  |  8   ----------------------------<  155<H>  4.2   |  23  |  0.64    Ca    9.3      20 Dec 2020 04:24  Phos  3.5     12-20  Mg     2.2         TPro  7.4  /  Alb  3.5  /  TBili  0.5  /  DBili  x   /  AST  17  /  ALT  21  /  AlkPhos  58  12-19    PT/INR - ( 20 Dec 2020 04:24 )   PT: 14.7 sec;   INR: 1.24 ratio         PTT - ( 20 Dec 2020 04:24 )  PTT:>200.0 sec      Urinalysis Basic - ( 19 Dec 2020 19:15 )    Color: Yellow / Appearance: Clear / S.005 / pH: x  Gluc: x / Ketone: Negative  / Bili: Negative / Urobili: Negative mg/dL   Blood: x / Protein: Negative mg/dL / Nitrite: Negative   Leuk Esterase: Negative / RBC: x / WBC x   Sq Epi: x / Non Sq Epi: x / Bacteria: x          RADIOLOGY & ADDITIONAL TESTS:    Imaging Personally Reviewed:    Electrocardiogram Personally Reviewed:    COORDINATION OF CARE:  Care Discussed with Consultants/Other Providers [Y/N]:  Prior or Outpatient Records Reviewed [Y/N]:

## 2020-12-20 NOTE — H&P ADULT - NSICDXPASTMEDICALHX_GEN_ALL_CORE_FT
PAST MEDICAL HISTORY:  Bladder prolapse, female, acquired     Carpal tunnel syndrome     IBS (irritable bowel syndrome)     Raynaud's phenomenon     Right shoulder pain on mobic    Uterine prolapse

## 2020-12-20 NOTE — H&P ADULT - NSHPREVIEWOFSYSTEMS_GEN_ALL_CORE
REVIEW OF SYSTEMS:    CONSTITUTIONAL: No weakness, fevers, +chills  EYES/ENT: No visual changes;  no throat pain, +PND   NECK: No pain or stiffness  RESPIRATORY: No cough, +shortness of breath as per HPI  CARDIOVASCULAR: R anterior chest pain, no palpitations, no LE swelling   GASTROINTESTINAL: +nausea, no vomiting, no abdominal pain, no BRBPR  GENITOURINARY: no polyuria, no dysuria  NEUROLOGICAL: no numbness, no headaches, no confusion   MUSCULOSKELETAL: +R low back pain, no focal weakness   SKIN: No itching, burning, rashes, or lesions   PSYCH: no anxiety, depression  HEME: no gum bleeding, no bruising

## 2020-12-20 NOTE — PROGRESS NOTE ADULT - PROBLEM SELECTOR PLAN 2
CT A/P noted with 2.6 cm fluid collection in the anterior abdominal wall, likely degenerating hematoma or organized seroma  - low suspicion for infection given no abdominal pain and benign exam   - inpt vs outpt f/u with plastics (Dr. Pramod Martinez) to review imaging and evaluate  further   - c/w abdominal binder   - CT A/P noted with 2.6 cm fluid collection in the anterior abdominal wall, likely degenerating hematoma or organized seroma  - low suspicion for infection given no abdominal pain and benign exam   - seen with plastics Dr. Pramod Martinez) bedside, rec no intervention, no s/s of infection  - c/w abdominal binder   -

## 2020-12-20 NOTE — PROGRESS NOTE ADULT - PROBLEM SELECTOR PLAN 3
WBC of 15-16, unclear etiology - ?reactive to PE vs infectious; pt denying fevers or any other localizing symptoms; imaging showing fluid collection at surgical site likely degrading hematoma vs seroma  - f/u with plastics   - defer abx for now and monitor CBC WBC of 15-16, unclear etiology - likely reactive to PE unlikiely infectious; pt denying fevers or any other localizing symptoms; imaging showing fluid collection at surgical site likely degrading hematoma vs seroma  - per plastic no antibiotics needed  - defer abx for now and monitor CBC WBC of 15-16, unclear etiology - likely reactive to PE/pulm infarct unlikely infectious; pt denying fevers or any other localizing symptoms; imaging showing fluid collection at surgical site likely degrading hematoma vs seroma  - per plastic no antibiotics needed  - defer abx for now and monitor CBC

## 2020-12-20 NOTE — PROGRESS NOTE ADULT - PROBLEM SELECTOR PLAN 5
therapeutic on heparin gtt dvt ppx: on heparin, transition to po eliquis at 7pm  Dispo: home vanessa if stable on oral a/c and US/TTE with no acute findings

## 2020-12-20 NOTE — ED PROVIDER NOTE - CLINICAL SUMMARY MEDICAL DECISION MAKING FREE TEXT BOX
Pt p/w PE from outside hospital in NAD on exam satting well on RA not tachycardic. Will admit to hospitalist.

## 2020-12-20 NOTE — H&P ADULT - HISTORY OF PRESENT ILLNESS
55F with PMHof IBS, Reynaud's, s/p ANURADHA/BSO with abdominoplasty, lipo, and sacrocolpopexy on 11/24/20 p/w SOB and chest pain. Pt states she saw her plastic surgeon on 12/18/20, had some minimal (30cc) fluid drained from surgical site. That evening, started having R lower back pain, which the started radiating to her R shoulder and R anterior chest as the night progressed. Overnight, pt noted pain would get worse with deep breaths, described as sharp shooting pains, 10/10. Took Mobic and aleve overnight with no improvement in pain. The morning of presentation, pt noted ongoing pain, but now with associated SOB with any exertion, talking and when laying flat. Denies any LE swelling or calf pain, tenderness. Denies any associated L sided CP, no palpitations, no lightheadedness or dizziness. Endorses some nausea without vomiting while symptoms have been ongoing; denies any abdominal pain, states surgical sites healing well, no diarrhea, no urinary symptoms. No personal history of PE; +family hx of DVT in mother.   Pt presented to OSH and had CTA showing RLL PE - started on heparin gtt, s/p morphine IVP for pain; transferred to [facility] at pt's request.     Pt states pain and SOB are significantly improved; pain 3/10.

## 2020-12-20 NOTE — ED ADULT NURSE NOTE - NSIMPLEMENTINTERV_GEN_ALL_ED
119
Implemented All Fall with Harm Risk Interventions:  [place] to call system. Call bell, personal items and telephone within reach. Instruct patient to call for assistance. Room bathroom lighting operational. Non-slip footwear when patient is off stretcher. Physically safe environment: no spills, clutter or unnecessary equipment. Stretcher in lowest position, wheels locked, appropriate side rails in place. Provide visual cue, wrist band, yellow gown, etc. Monitor gait and stability. Monitor for mental status changes and reorient to person, place, and time. Review medications for side effects contributing to fall risk. Reinforce activity limits and safety measures with patient and family. Provide visual clues: red socks.

## 2020-12-20 NOTE — PROGRESS NOTE ADULT - PROBLEM SELECTOR PLAN 1
acute SOB and R lower back and R shoulder/anterior chest pain, found with RLL PE with pulm infarct, in setting of recent surgery. BP near baseline, not hypoxic.    - CP pain and SOB improved  - c/w heparin gtt for now, will need to transition to DOAC in AM   - no e/o R heart strain on CT, but will check EKG, trop and BNP   - check TTE   - currently not hypoxic, monitor O2 sat, supplemental O2 as needed acute SOB and R lower back and R shoulder/anterior chest pain, found with RLL PE with pulm infarct, in setting of recent surgery.   - BP at baseline, not hypoxic, on RA  - CP pain and SOB resolved,   - c/w heparin gtt for now, will need to transition to DOAC in AM   - no e/o R heart strain on CT, but will check EKG, trop and BNP   - check TTE   - currently not hypoxic, monitor O2 sat, supplemental O2 as needed acute SOB and R lower back and R shoulder/anterior chest pain, found with RLL PE with pulm infarct, in setting of recent surgery.   - BP at baseline, not hypoxic, on RA  - CP pain and SOB resolved,   - c/w heparin gtt for now, will need to transition to DOAC in AM   - no e/o R heart strain on CT, troponin and bnp negative  - check TTE and pelvic and LE duplex  - vascular cards Dr Zhao consulted, agree with transition to eliquis 10mg bid tonight at 7pm and stop heparin ggt at the time.  Discussed r/b/a of a/c options with patient who agreed with eliquis as above acute SOB and R lower back and R shoulder/anterior chest pain, found with RLL PE with pulm infarct, in setting of recent surgery.   - BP at baseline, not hypoxic, on RA  - CP pain and SOB resolved,   - c/w heparin gtt for now, will need to transition to DOAC in AM   - no e/o R heart strain on CT, troponin and bnp negative  - pending TTE, pelvic and LE duplex  - get exertional 02 sat on RA  - vascular cards Dr Zhao consulted, agree with transition to eliquis 10mg bid tonight at 7pm and stop heparin ggt at the time.  Discussed r/b/a of a/c options with patient who agreed with eliquis as above acute SOB and R lower back and R shoulder/anterior chest pain, found with RLL PE with pulm infarct, in setting of recent surgery, likely provoked  - BP at baseline, not hypoxic, on RA  - CP pain and SOB resolved,   - c/w heparin gtt for now, will need to transition to DOAC in AM   - no e/o R heart strain on CT, troponin and bnp negative  - pending TTE, pelvic and LE duplex  - get exertional 02 sat on RA  - vascular cards Dr Zhao consulted, agree with transition to eliquis 10mg bid tonight at 7pm and stop heparin ggt at the time.  Discussed r/b/a of a/c options with patient who agreed with eliquis as above

## 2020-12-21 ENCOUNTER — TRANSCRIPTION ENCOUNTER (OUTPATIENT)
Age: 55
End: 2020-12-21

## 2020-12-21 PROBLEM — N81.10 CYSTOCELE, UNSPECIFIED: Chronic | Status: ACTIVE | Noted: 2020-12-19

## 2020-12-21 PROBLEM — K58.9 IRRITABLE BOWEL SYNDROME, UNSPECIFIED: Chronic | Status: ACTIVE | Noted: 2020-12-19

## 2020-12-21 PROBLEM — N81.4 UTEROVAGINAL PROLAPSE, UNSPECIFIED: Chronic | Status: ACTIVE | Noted: 2020-12-19

## 2020-12-21 PROBLEM — K58.9 IRRITABLE BOWEL SYNDROME WITHOUT DIARRHEA: Chronic | Status: ACTIVE | Noted: 2020-12-19

## 2020-12-21 PROBLEM — I73.00 RAYNAUD'S SYNDROME WITHOUT GANGRENE: Chronic | Status: ACTIVE | Noted: 2020-12-19

## 2020-12-21 PROBLEM — G56.00 CARPAL TUNNEL SYNDROME, UNSPECIFIED UPPER LIMB: Chronic | Status: ACTIVE | Noted: 2020-12-19

## 2020-12-21 LAB
HCT VFR BLD CALC: 36.7 % — SIGNIFICANT CHANGE UP (ref 34.5–45)
HGB BLD-MCNC: 12 G/DL — SIGNIFICANT CHANGE UP (ref 11.5–15.5)
MCHC RBC-ENTMCNC: 29 PG — SIGNIFICANT CHANGE UP (ref 27–34)
MCHC RBC-ENTMCNC: 32.7 GM/DL — SIGNIFICANT CHANGE UP (ref 32–36)
MCV RBC AUTO: 88.6 FL — SIGNIFICANT CHANGE UP (ref 80–100)
NRBC # BLD: 0 /100 WBCS — SIGNIFICANT CHANGE UP (ref 0–0)
PLATELET # BLD AUTO: 190 K/UL — SIGNIFICANT CHANGE UP (ref 150–400)
RBC # BLD: 4.14 M/UL — SIGNIFICANT CHANGE UP (ref 3.8–5.2)
RBC # FLD: 13.2 % — SIGNIFICANT CHANGE UP (ref 10.3–14.5)
SARS-COV-2 IGG SERPL QL IA: NEGATIVE — SIGNIFICANT CHANGE UP
SARS-COV-2 IGM SERPL IA-ACNC: 0.2 RATIO — SIGNIFICANT CHANGE UP
WBC # BLD: 11.13 K/UL — HIGH (ref 3.8–10.5)
WBC # FLD AUTO: 11.13 K/UL — HIGH (ref 3.8–10.5)

## 2020-12-21 PROCEDURE — 99233 SBSQ HOSP IP/OBS HIGH 50: CPT

## 2020-12-21 PROCEDURE — 99222 1ST HOSP IP/OBS MODERATE 55: CPT

## 2020-12-21 PROCEDURE — G0452: CPT | Mod: 26

## 2020-12-21 PROCEDURE — 93306 TTE W/DOPPLER COMPLETE: CPT | Mod: 26

## 2020-12-21 RX ORDER — METOCLOPRAMIDE HCL 10 MG
1 TABLET ORAL
Qty: 9 | Refills: 0
Start: 2020-12-21 | End: 2020-12-23

## 2020-12-21 RX ORDER — OXYCODONE HYDROCHLORIDE 5 MG/1
1 TABLET ORAL
Qty: 12 | Refills: 0
Start: 2020-12-21 | End: 2020-12-23

## 2020-12-21 RX ORDER — APIXABAN 2.5 MG/1
2 TABLET, FILM COATED ORAL
Qty: 120 | Refills: 0
Start: 2020-12-21 | End: 2021-01-19

## 2020-12-21 RX ORDER — ACETAMINOPHEN 500 MG
1000 TABLET ORAL EVERY 6 HOURS
Refills: 0 | Status: DISCONTINUED | OUTPATIENT
Start: 2020-12-21 | End: 2020-12-22

## 2020-12-21 RX ORDER — ONDANSETRON 8 MG/1
4 TABLET, FILM COATED ORAL EVERY 8 HOURS
Refills: 0 | Status: DISCONTINUED | OUTPATIENT
Start: 2020-12-21 | End: 2020-12-22

## 2020-12-21 RX ORDER — ACETAMINOPHEN 500 MG
1100 TABLET ORAL EVERY 6 HOURS
Refills: 0 | Status: DISCONTINUED | OUTPATIENT
Start: 2020-12-21 | End: 2020-12-21

## 2020-12-21 RX ORDER — MELOXICAM 15 MG/1
1 TABLET ORAL
Qty: 0 | Refills: 0 | DISCHARGE

## 2020-12-21 RX ORDER — METOCLOPRAMIDE HCL 10 MG
10 TABLET ORAL ONCE
Refills: 0 | Status: COMPLETED | OUTPATIENT
Start: 2020-12-21 | End: 2020-12-21

## 2020-12-21 RX ORDER — ACETAMINOPHEN 500 MG
3 TABLET ORAL
Qty: 18 | Refills: 0
Start: 2020-12-21 | End: 2020-12-22

## 2020-12-21 RX ORDER — PSEUDOEPHEDRINE HCL 30 MG
0 TABLET ORAL
Qty: 0 | Refills: 0 | DISCHARGE

## 2020-12-21 RX ADMIN — OXYCODONE HYDROCHLORIDE 5 MILLIGRAM(S): 5 TABLET ORAL at 07:58

## 2020-12-21 RX ADMIN — ONDANSETRON 4 MILLIGRAM(S): 8 TABLET, FILM COATED ORAL at 07:58

## 2020-12-21 RX ADMIN — POLYETHYLENE GLYCOL 3350 17 GRAM(S): 17 POWDER, FOR SOLUTION ORAL at 14:37

## 2020-12-21 RX ADMIN — Medication 1000 MILLIGRAM(S): at 12:52

## 2020-12-21 RX ADMIN — SENNA PLUS 2 TABLET(S): 8.6 TABLET ORAL at 21:23

## 2020-12-21 RX ADMIN — OXYCODONE HYDROCHLORIDE 5 MILLIGRAM(S): 5 TABLET ORAL at 08:15

## 2020-12-21 RX ADMIN — APIXABAN 10 MILLIGRAM(S): 2.5 TABLET, FILM COATED ORAL at 19:31

## 2020-12-21 RX ADMIN — Medication 400 MILLIGRAM(S): at 12:37

## 2020-12-21 RX ADMIN — Medication 1 TABLET(S): at 14:37

## 2020-12-21 RX ADMIN — APIXABAN 10 MILLIGRAM(S): 2.5 TABLET, FILM COATED ORAL at 06:39

## 2020-12-21 RX ADMIN — Medication 10 MILLIGRAM(S): at 12:38

## 2020-12-21 RX ADMIN — OXYCODONE HYDROCHLORIDE 5 MILLIGRAM(S): 5 TABLET ORAL at 00:05

## 2020-12-21 NOTE — PROGRESS NOTE ADULT - PROBLEM SELECTOR PLAN 3
WBC of 15-16, unclear etiology - likely reactive to PE/pulm infarct unlikely infectious; pt denying fevers or any other localizing symptoms; imaging showing fluid collection at surgical site likely degrading hematoma vs seroma- now improving w/o abx  - per plastic no antibiotics needed  - defer abx for now and monitor CBC

## 2020-12-21 NOTE — PROVIDER CONTACT NOTE (OTHER) - SITUATION
Pt received no pain relief with tramadol
Pt requesting zofran in order to be able to take oxycodone
Pt complaining of back pain 7/10

## 2020-12-21 NOTE — DISCHARGE NOTE PROVIDER - HOSPITAL COURSE
55F with PMHof IBS, Reynaud's, s/p ANURADHA/BSO with abdominoplasty, lipo, and sacrocolpopexy on 11/24/20 p/w SOB and chest pain, found to have RLL PE. During hospital course, patient with no evidence of hypoxia, TTE negative for R heart strain, underwent coagulopathy work-up (results to be followed up outpatient). Patient was subsequently transitioned from Hep gtt to Eliquis, managed for pleuritic pain 2/2 PE. Patient deemed medically stable for d/c home, with plan to continue AC and [facility] one week.   55F with PMHof IBS, Reynaud's, s/p ANURADHA/BSO with abdominoplasty, lipo, and sacrocolpopexy on 11/24/20 p/w SOB and chest pain, found to have RLL PE. During hospital course, patient with no evidence of hypoxia, TTE negative for R heart strain, underwent coagulopathy work-up (results to be followed up outpatient). Patient was subsequently transitioned from Hep gtt to Eliquis, managed for pleuritic pain 2/2 PE. Patient deemed medically stable for d/c home, with plan to continue AC and follow-up with [facility]- Dr. Zhao in one week. Cleared for discharge as per Dr Mckeon.

## 2020-12-21 NOTE — CONSULT NOTE ADULT - ATTENDING COMMENTS
sPESI score is zero  low risk  continue with eliquis as planned  outpatient followup  will send thrombophilia workup given her mother's history of clotting    [place]

## 2020-12-21 NOTE — PROVIDER CONTACT NOTE (OTHER) - ASSESSMENT
Pt A&Ox4. Pt states that the pain is a 7/10 when she tries to lay down.
Pt complaining of right back pain. Says that the zofran helped with the nausea when taking the oxycodone.
Pt A&OX4. Pt states that the tramadol gave her no relief. She does say that the oxycodone worked for 30 min but gave her nausea.

## 2020-12-21 NOTE — PROGRESS NOTE ADULT - SUBJECTIVE AND OBJECTIVE BOX
Resting in bed.  Still with back pain but improving  VSS - afebrile    PE:  suture lines intact  skin flap and umb with good color throughout  no ischemia or cyanosis  no sig change in palpable small seroma    doing well from plastics standpoint  Will consider tapping small seroma as outpatient  cont anticoagulation  will cont to follow

## 2020-12-21 NOTE — PROVIDER CONTACT NOTE (OTHER) - REASON
Pt complaining of back pain 7/10
Pt received no pain relief with tramadol
Pt requesting zofran in order to be able to take oxycodone

## 2020-12-21 NOTE — DISCHARGE NOTE PROVIDER - NSDCMRMEDTOKEN_GEN_ALL_CORE_FT
Align 4 mg oral capsule: 1 cap(s) orally once a day  apixaban 5 mg oral tablet: 2 tab(s) (10 mg) orally every 12 hours for total of 7 days then on 12/28 then switch to 1 tab (5mg) twice per day  Colace 100 mg oral capsule: orally once a day  Colace 100 mg oral capsule: 2  orally once a day (at bedtime)  oxyCODONE 5 mg oral tablet: 1 tab(s) orally every 6 hours, As needed, moderate to severe pain MDD:20mg   acetaminophen 325 mg oral tablet: 3 tab(s) orally every 8 hours, As Needed  x 2 days for pain   Align 4 mg oral capsule: 1 cap(s) orally once a day  apixaban 5 mg oral tablet: 2 tab(s) (10 mg) orally every 12 hours for total of 7 days then on  then switch to 1 tab (5mg) twice per day  Colace 100 mg oral capsule: orally once a day  Colace 100 mg oral capsule: 2  orally once a day (at bedtime)  metoclopramide 10 mg oral tablet, disintegratin tab(s) orally every 8 hours, As Needed for nausea  oxyCODONE 5 mg oral tablet: 1 tab(s) orally every 6 hours, As needed, moderate to severe pain MDD:20mg   acetaminophen 325 mg oral tablet: 3 tab(s) orally every 8 hours, As Needed  x 2 days for pain   Align 4 mg oral capsule: 1 cap(s) orally once a day  apixaban 5 mg oral tablet: 2 tab(s) (10 mg) orally every 12 hours for total of 7 days then on  then switch to 1 tab (5mg) twice per day  Colace 100 mg oral capsule: 2  orally once a day (at bedtime)  metoclopramide 10 mg oral tablet, disintegratin tab(s) orally every 8 hours, As Needed for nausea  oxyCODONE 5 mg oral tablet: 1 tab(s) orally every 6 hours, As needed, moderate to severe pain MDD:20mg   acetaminophen 325 mg oral tablet: 3 tab(s) orally every 8 hours, As Needed  x 2 days for pain   Align 4 mg oral capsule: 1 cap(s) orally once a day  apixaban 5 mg oral tablet: 2 tab(s) orally 2 times a day x till 12/27 am dose  then 5 mg twice daily x 30 days  Colace 100 mg oral capsule: 2  orally once a day (at bedtime)  oxyCODONE 5 mg oral tablet: 1 tab(s) orally every 6 hours MDD:4   acetaminophen 325 mg oral tablet: 3 tab(s) orally every 8 hours, As Needed  x 2 days for pain   Align 4 mg oral capsule: 1 cap(s) orally once a day  apixaban 5 mg oral tablet: 2 tab(s) orally 2 times a day x till 12/27 am dose  then 5 mg twice daily x 30 days  Colace 100 mg oral capsule: 2  orally once a day (at bedtime)  metoclopramide 10 mg oral tablet: 1 tab(s) orally every 8 hours, As Needed  oxyCODONE 5 mg oral tablet: 1 tab(s) orally every 6 hours MDD:4

## 2020-12-21 NOTE — CONSULT NOTE ADULT - SUBJECTIVE AND OBJECTIVE BOX
Vascular Cardiology Consult Note    DIRECT SERVICE NUMBER:  [phone]           EMAIL [email or web address]   OFFICE [phone]    CC:  SOB    HPI:    54 yo F with hx of ANURADHA/BSO with abdominoplasty, lipo, sacrocolpopexy on 20 who presents with SOB and chest pain. She was found to have a RLL PE and started on heparin gtt. She has been switched to apixaban. Symptoms have now improved. She has a family history w/ mother having blood clots. No personal history of clotting disorder. No recent illness or long travel.        Allergies    No Known Allergies    Intolerances    	    MEDICATIONS:  apixaban 10 milliGRAM(s) Oral every 12 hours        acetaminophen   Tablet .. 650 milliGRAM(s) Oral every 6 hours PRN  ondansetron Injectable 4 milliGRAM(s) IV Push every 8 hours PRN  oxyCODONE    IR 5 milliGRAM(s) Oral every 6 hours PRN    polyethylene glycol 3350 17 Gram(s) Oral daily  senna 2 Tablet(s) Oral at bedtime          PAST MEDICAL & SURGICAL HISTORY:  Bladder prolapse, female, acquired    Uterine prolapse    Raynaud&#x27;s phenomenon    Carpal tunnel syndrome    IBS (irritable bowel syndrome)    Right shoulder pain  on mobic    S/P sacrocolpopexy    S/P abdominoplasty    S/P ANURADHA-BSO (total abdominal hysterectomy and bilateral salpingo-oophorectomy)    S/P laparoscopic cholecystectomy      S/P laparoscopic appendectomy          FAMILY HISTORY:  FH: lung cancer  Father:     FH: COPD (chronic obstructive pulmonary disease)  Mother: 77y    FH: breast cancer  Mother: 77    FH: atrial fibrillation  Mother: 77        SOCIAL HISTORY:  unchanged    REVIEW OF SYSTEMS:  CONSTITUTIONAL: No fever, weight loss, or fatigue  EYES: No eye pain, visual disturbances, or discharge  ENT:  No difficulty hearing, tinnitus, vertigo; No sinus or throat pain  NECK: No pain or stiffness  RESPIRATORY:  +SOB  CARDIOVASCULAR:  No CP  GASTROINTESTINAL: No abdominal or epigastric pain. No nausea, vomiting, or hematemesis; No diarrhea or constipation. No melena or hematochezia.  GENITOURINARY: No dysuria, frequency, hematuria, or incontinence  NEUROLOGICAL: No headaches, memory loss, loss of strength, numbness, or tremors  SKIN: No rashes  LYMPH Nodes: No enlarged glands  ENDOCRINE: No heat or cold intolerance; No hair loss  MUSCULOSKELETAL: No joint pain or swelling; No muscle, back, or extremity pain  PSYCHIATRIC: No depression, anxiety, mood swings, or difficulty sleeping  HEME/LYMPH: No easy bruising, or bleeding gums  ALLERGY AND IMMUNOLOGIC: No hives or eczema	    [ x] All others negative	  [ ] Unable to obtain    PHYSICAL EXAM:  T(C): 36.7 (20 @ 06:31), Max: 37.3 (20 @ 11:59)  HR: 91 (20 @ 06:31) (74 - 91)  BP: 106/74 (20 @ 06:31) (99/65 - 123/78)  RR: 18 (20 @ 06:31) (18 - 20)  SpO2: 94% (20 @ 06:31) (94% - 99%)  Wt(kg): --  I&O's Summary    20 Dec 2020 07:01  -  21 Dec 2020 07:00  --------------------------------------------------------  IN: 860 mL / OUT: 2 mL / NET: 858 mL        Appearance:  	No acute distress  HEENT:   Normal oral mucosa, PERRL, EOMI	  Cardiovascular:  RRR, no mururs  Respiratory:  	CTAB  Gastrointestinal:  Soft, Non-tender, surgical scar  Skin: No rashes, No ecchymoses, No cyanosis	  Neurologic:  AOx3  Extremities:  no CAMPBELL  Right DP: []palpable []non-palpable []audible      Left DP :   []palpable []non-palpable []audible  Right PT: []palpable [] non-palpable []audible   Left PT:  [] palpable [] non-palpable []audible        LABS:	 	    CBC Full  -  ( 21 Dec 2020 06:46 )  WBC Count : 11.13 K/uL  Hemoglobin : 12.0 g/dL  Hematocrit : 36.7 %  Platelet Count - Automated : 190 K/uL  Mean Cell Volume : 88.6 fl  Mean Cell Hemoglobin : 29.0 pg  Mean Cell Hemoglobin Concentration : 32.7 gm/dL  Auto Neutrophil # : x  Auto Lymphocyte # : x  Auto Monocyte # : x  Auto Eosinophil # : x  Auto Basophil # : x  Auto Neutrophil % : x  Auto Lymphocyte % : x  Auto Monocyte % : x  Auto Eosinophil % : x  Auto Basophil % : x        140  |  106  |  8   ----------------------------<  155<H>  4.2   |  23  |  0.64      137  |  105  |  9   ----------------------------<  107<H>  3.8   |  22  |  0.66    Ca    9.3      20 Dec 2020 04:24  Ca    9.1      19 Dec 2020 19:35  Phos  3.5       Mg     2.2       Mg     2.0         TPro  7.4  /  Alb  3.5  /  TBili  0.5  /  DBili  x   /  AST  17  /  ALT  21  /  AlkPhos  58      CT C/A/P 20  1. Acute segmental and subsegmental pulmonary emboli in the right lower lobe with developing pulmonary infarct in the posterior basilar segment.  2. No evidence of right heart strain.  3. Surgical changes from recent abdominoplasty and supracervical hysterectomy. There is an additional 2.6 cm organized fluid collection in the anterior abdominal wall inseparable from the left rectus abdominis, probably represents a degenerating hematoma or organized seroma. Differential also includes abdominal wall endometriosis in the appropriate clinical setting.    LE duplex 20  No DVT    Assessment:  54 yo F with hx of ANURADHA/BSO with abdominoplasty, lipo, sacrocolpopexy on 20 who presents with SOB and chest pain, found to have RLL PE. Hemodynamically stable, bnp/trop negative.     1. RLL PE, no DVT       Plan:  1. Obtain echocardiogram  2. Obtain labs: Lupus anticoagulant Anticardiolipin AB, Anti B2 glycoprotein, Factor V Leiden, Prothrombin gene mutation   3. C/w apixaban      Thank you      Vascular Cardiology Service    Please call with any questions:   DIRECT SERVICE NUMBER:  [phone]  Office [phone]  email:  [email or web address]     Vascular Cardiology Consult Note    DIRECT SERVICE NUMBER:  [phone]           EMAIL [email or web address]   OFFICE [phone]    CC:  SOB    HPI:    54 yo F with hx of ANURADHA/BSO with abdominoplasty, lipo, sacrocolpopexy on 20 presented with SOB and chest pain. She saw her plastic surgeon on 20, had some minimal fluid drained from abdominal surgical site. That evening, started having R lower back pain, which the started radiating to her R shoulder and R anterior chest as the night progressed. The pain woke her up in the middle of the night and she noted worsening pain with deep breaths. She took Mobic and aleve overnight but the pain continued in the morning along with new SOB with minimal exertion. She had no CAMPBELL or calf pain. She presented to the ED and was found to have a RLL PE and started on heparin gtt. She has been switched to apixaban since last night. Overall her breathing has improved but notes that the right low back pain is now traveling up to mid back and she feels that taking a deep breath now causes more pain. She has a family history w/ mother having blood clots due to porphyria. No personal history of clotting disorder. No recent illness. For the first week after surgery patient was bedbound but since then she has been walking around the house.       Allergies    No Known Allergies    Intolerances    	    MEDICATIONS:  apixaban 10 milliGRAM(s) Oral every 12 hours        acetaminophen   Tablet .. 650 milliGRAM(s) Oral every 6 hours PRN  ondansetron Injectable 4 milliGRAM(s) IV Push every 8 hours PRN  oxyCODONE    IR 5 milliGRAM(s) Oral every 6 hours PRN    polyethylene glycol 3350 17 Gram(s) Oral daily  senna 2 Tablet(s) Oral at bedtime          PAST MEDICAL & SURGICAL HISTORY:  Bladder prolapse, female, acquired    Uterine prolapse    Raynaud&#x27;s phenomenon    Carpal tunnel syndrome    IBS (irritable bowel syndrome)    Right shoulder pain  on mobic    S/P sacrocolpopexy    S/P abdominoplasty    S/P ANURADHA-BSO (total abdominal hysterectomy and bilateral salpingo-oophorectomy)    S/P laparoscopic cholecystectomy      S/P laparoscopic appendectomy          FAMILY HISTORY:  FH: lung cancer  Father:     FH: COPD (chronic obstructive pulmonary disease)  Mother: 77y    FH: breast cancer  Mother: 77    FH: atrial fibrillation  Mother: 77        SOCIAL HISTORY:  unchanged    REVIEW OF SYSTEMS:  CONSTITUTIONAL: No fever, weight loss, or fatigue  EYES: No eye pain, visual disturbances, or discharge  ENT:  No difficulty hearing, tinnitus, vertigo; No sinus or throat pain  NECK: No pain or stiffness  RESPIRATORY:  +SOB  CARDIOVASCULAR:  No CP  GASTROINTESTINAL: No abdominal or epigastric pain. No nausea, vomiting, or hematemesis; No diarrhea or constipation. No melena or hematochezia.  GENITOURINARY: No dysuria, frequency, hematuria, or incontinence  NEUROLOGICAL: No headaches, memory loss, loss of strength, numbness, or tremors  SKIN: No rashes  LYMPH Nodes: No enlarged glands  ENDOCRINE: No heat or cold intolerance; No hair loss  MUSCULOSKELETAL: No joint pain or swelling; No muscle, back, or extremity pain  PSYCHIATRIC: No depression, anxiety, mood swings, or difficulty sleeping  HEME/LYMPH: No easy bruising, or bleeding gums  ALLERGY AND IMMUNOLOGIC: No hives or eczema	    [ x] All others negative	  [ ] Unable to obtain    PHYSICAL EXAM:  T(C): 36.7 (20 @ 06:31), Max: 37.3 (20 @ 11:59)  HR: 91 (20 @ 06:31) (74 - 91)  BP: 106/74 (20 @ 06:31) (99/65 - 123/78)  RR: 18 (20 @ 06:31) (18 - 20)  SpO2: 94% (20 @ 06:31) (94% - 99%)  Wt(kg): --  I&O's Summary    20 Dec 2020 07:01  -  21 Dec 2020 07:00  --------------------------------------------------------  IN: 860 mL / OUT: 2 mL / NET: 858 mL        Appearance:  	No acute distress  HEENT:   Normal oral mucosa, PERRL, EOMI	  Cardiovascular:  RRR, no mururs  Respiratory:  	CTAB  Gastrointestinal:  Soft, Non-tender, surgical scar  Skin: No rashes, No ecchymoses, No cyanosis	  Neurologic:  AOx3  Extremities:  no CAMPBELL  Right DP: []palpable []non-palpable []audible      Left DP :   []palpable []non-palpable []audible  Right PT: []palpable [] non-palpable []audible   Left PT:  [] palpable [] non-palpable []audible        LABS:	 	    CBC Full  -  ( 21 Dec 2020 06:46 )  WBC Count : 11.13 K/uL  Hemoglobin : 12.0 g/dL  Hematocrit : 36.7 %  Platelet Count - Automated : 190 K/uL  Mean Cell Volume : 88.6 fl  Mean Cell Hemoglobin : 29.0 pg  Mean Cell Hemoglobin Concentration : 32.7 gm/dL  Auto Neutrophil # : x  Auto Lymphocyte # : x  Auto Monocyte # : x  Auto Eosinophil # : x  Auto Basophil # : x  Auto Neutrophil % : x  Auto Lymphocyte % : x  Auto Monocyte % : x  Auto Eosinophil % : x  Auto Basophil % : x        140  |  106  |  8   ----------------------------<  155<H>  4.2   |  23  |  0.64      137  |  105  |  9   ----------------------------<  107<H>  3.8   |  22  |  0.66    Ca    9.3      20 Dec 2020 04:24  Ca    9.1      19 Dec 2020 19:35  Phos  3.5     -  Mg     2.2     -20  Mg     2.0         TPro  7.4  /  Alb  3.5  /  TBili  0.5  /  DBili  x   /  AST  17  /  ALT  21  /  AlkPhos  58      CT C/A/P 20  1. Acute segmental and subsegmental pulmonary emboli in the right lower lobe with developing pulmonary infarct in the posterior basilar segment.  2. No evidence of right heart strain.  3. Surgical changes from recent abdominoplasty and supracervical hysterectomy. There is an additional 2.6 cm organized fluid collection in the anterior abdominal wall inseparable from the left rectus abdominis, probably represents a degenerating hematoma or organized seroma. Differential also includes abdominal wall endometriosis in the appropriate clinical setting.    LE duplex 20  No DVT    Assessment:  54 yo F with hx of ANURADHA/BSO with abdominoplasty, lipo, sacrocolpopexy on 20 who presents with SOB and chest pain, found to have RLL PE. Hemodynamically stable, bnp/trop negative, but remains symptomatic w/ chest/back pain.    1. RLL PE, no DVT       Plan:  1. Obtain echocardiogram  2. Obtain labs: Lupus anticoagulant Anticardiolipin AB, Anti B2 glycoprotein, Factor V Leiden, Prothrombin gene mutation   3. C/w apixaban  4. Provide follow up with Dr. Zhao on discharge      Thank you      Vascular Cardiology Service    Please call with any questions:   DIRECT SERVICE NUMBER:  [phone]  Office [phone]  email:  [email or web address]     Vascular Cardiology Consult Note    DIRECT SERVICE NUMBER:  [phone]           EMAIL [email or web address]   OFFICE [phone]    CC:  SOB    HPI:    54 yo F with hx of ANURADHA/BSO with abdominoplasty, lipo, sacrocolpopexy on 20 presented with SOB and chest pain. She saw her plastic surgeon on 20, had some minimal fluid drained from abdominal surgical site. That evening, started having R lower back pain, which the started radiating to her R shoulder and R anterior chest as the night progressed. The pain woke her up in the middle of the night and she noted worsening pain with deep breaths. She took Mobic and aleve overnight but the pain continued in the morning along with new SOB with minimal exertion. She had no CAMPBELL or calf pain. She presented to the ED and was found to have a RLL PE and started on heparin gtt. She has been switched to apixaban since last night. Overall her breathing has improved but notes that the right low back pain is now traveling up to mid back and she feels that taking a deep breath now causes more pain. She has a family history w/ mother having blood clots due to porphyria. No personal history of clotting disorder. No recent illness. For the first week after surgery patient was bedbound but since then she has been walking around the house.       Allergies    No Known Allergies    Intolerances    	    MEDICATIONS:  apixaban 10 milliGRAM(s) Oral every 12 hours        acetaminophen   Tablet .. 650 milliGRAM(s) Oral every 6 hours PRN  ondansetron Injectable 4 milliGRAM(s) IV Push every 8 hours PRN  oxyCODONE    IR 5 milliGRAM(s) Oral every 6 hours PRN    polyethylene glycol 3350 17 Gram(s) Oral daily  senna 2 Tablet(s) Oral at bedtime          PAST MEDICAL & SURGICAL HISTORY:  Bladder prolapse, female, acquired    Uterine prolapse    Raynaud&#x27;s phenomenon    Carpal tunnel syndrome    IBS (irritable bowel syndrome)    Right shoulder pain  on mobic    S/P sacrocolpopexy    S/P abdominoplasty    S/P ANURADHA-BSO (total abdominal hysterectomy and bilateral salpingo-oophorectomy)    S/P laparoscopic cholecystectomy      S/P laparoscopic appendectomy          FAMILY HISTORY:  FH: lung cancer  Father:     FH: COPD (chronic obstructive pulmonary disease)  Mother: 77y    FH: breast cancer  Mother: 77    FH: atrial fibrillation  Mother: 77        SOCIAL HISTORY:  unchanged    REVIEW OF SYSTEMS:  CONSTITUTIONAL: No fever, weight loss, or fatigue  EYES: No eye pain, visual disturbances, or discharge  ENT:  No difficulty hearing, tinnitus, vertigo; No sinus or throat pain  NECK: No pain or stiffness  RESPIRATORY:  +SOB  CARDIOVASCULAR:  No CP  GASTROINTESTINAL: No abdominal or epigastric pain. No nausea, vomiting, or hematemesis; No diarrhea or constipation. No melena or hematochezia.  GENITOURINARY: No dysuria, frequency, hematuria, or incontinence  NEUROLOGICAL: No headaches, memory loss, loss of strength, numbness, or tremors  SKIN: No rashes  LYMPH Nodes: No enlarged glands  ENDOCRINE: No heat or cold intolerance; No hair loss  MUSCULOSKELETAL: No joint pain or swelling; No muscle, back, or extremity pain  PSYCHIATRIC: No depression, anxiety, mood swings, or difficulty sleeping  HEME/LYMPH: No easy bruising, or bleeding gums  ALLERGY AND IMMUNOLOGIC: No hives or eczema	    [ x] All others negative	  [ ] Unable to obtain    PHYSICAL EXAM:  T(C): 36.7 (20 @ 06:31), Max: 37.3 (20 @ 11:59)  HR: 91 (20 @ 06:31) (74 - 91)  BP: 106/74 (20 @ 06:31) (99/65 - 123/78)  RR: 18 (20 @ 06:31) (18 - 20)  SpO2: 94% (20 @ 06:31) (94% - 99%)  Wt(kg): --  I&O's Summary    20 Dec 2020 07:01  -  21 Dec 2020 07:00  --------------------------------------------------------  IN: 860 mL / OUT: 2 mL / NET: 858 mL        Appearance:  	No acute distress  HEENT:   Normal oral mucosa, PERRL, EOMI	  Cardiovascular:  RRR, no murmurs  Respiratory:  	CTAB, no respiratory disress  Gastrointestinal:  Soft, well healed surgical scar on abdomen  Skin: No rashes, No ecchymoses, No cyanosis	  Neurologic:  AOx3  Extremities:  no CAMPBELL  Vascular: bilateral DP pulses palpable    LABS:	 	    CBC Full  -  ( 21 Dec 2020 06:46 )  WBC Count : 11.13 K/uL  Hemoglobin : 12.0 g/dL  Hematocrit : 36.7 %  Platelet Count - Automated : 190 K/uL  Mean Cell Volume : 88.6 fl  Mean Cell Hemoglobin : 29.0 pg  Mean Cell Hemoglobin Concentration : 32.7 gm/dL  Auto Neutrophil # : x  Auto Lymphocyte # : x  Auto Monocyte # : x  Auto Eosinophil # : x  Auto Basophil # : x  Auto Neutrophil % : x  Auto Lymphocyte % : x  Auto Monocyte % : x  Auto Eosinophil % : x  Auto Basophil % : x        140  |  106  |  8   ----------------------------<  155<H>  4.2   |  23  |  0.64      137  |  105  |  9   ----------------------------<  107<H>  3.8   |  22  |  0.66    Ca    9.3      20 Dec 2020 04:24  Ca    9.1      19 Dec 2020 19:35  Phos  3.5     -  Mg     2.2     -  Mg     2.0     -    TPro  7.4  /  Alb  3.5  /  TBili  0.5  /  DBili  x   /  AST  17  /  ALT  21  /  AlkPhos  58  -    CT C/A/P 20  1. Acute segmental and subsegmental pulmonary emboli in the right lower lobe with developing pulmonary infarct in the posterior basilar segment.  2. No evidence of right heart strain.  3. Surgical changes from recent abdominoplasty and supracervical hysterectomy. There is an additional 2.6 cm organized fluid collection in the anterior abdominal wall inseparable from the left rectus abdominis, probably represents a degenerating hematoma or organized seroma. Differential also includes abdominal wall endometriosis in the appropriate clinical setting.    LE duplex 20  No DVT    Assessment:  54 yo F with hx of ANURADHA/BSO with abdominoplasty, lipo, sacrocolpopexy on 20 who presents with SOB and chest pain, found to have RLL PE. Hemodynamically stable, bnp/trop negative, but remains symptomatic w/ chest/back pain.    1. RLL PE, no DVT       Plan:  1. Obtain echocardiogram  2. Obtain labs: Lupus anticoagulant Anticardiolipin AB, Anti B2 glycoprotein, Factor V Leiden, Prothrombin gene mutation   3. C/w apixaban  4. Provide follow up with Dr. Zhao on discharge      Thank you      Vascular Cardiology Service    Please call with any questions:   DIRECT SERVICE NUMBER:  [phone]  Office [phone]  email:  [email or web address]     Vascular Cardiology Consult Note    DIRECT SERVICE NUMBER:  [phone]           EMAIL [email or web address]   OFFICE [phone]    CC:  SOB    HPI:    56 yo F with hx of ANURADHA/BSO with abdominoplasty, lipo, sacrocolpopexy on 20 presented with SOB and chest pain. She saw her plastic surgeon on 20, had some minimal fluid drained from abdominal surgical site. That evening, started having R lower back pain, which the started radiating to her R shoulder and R anterior chest as the night progressed. The pain woke her up in the middle of the night and she noted worsening pain with deep breaths. She took Mobic and aleve overnight but the pain continued in the morning along with new SOB with minimal exertion. She had no CAMPBELL or calf pain. She presented to the ED and was found to have a RLL PE and started on heparin gtt. She has been switched to apixaban since last night. Overall her breathing has improved but notes that the right low back pain is now traveling up to mid back and she feels that taking a deep breath now causes more pain. She has a family history w/ mother having blood clots on warfarin. No personal history of clotting disorder. No recent illness. For the first week after surgery patient was bedbound but since then she has been walking around the house.       Allergies    No Known Allergies    Intolerances    	    MEDICATIONS:  apixaban 10 milliGRAM(s) Oral every 12 hours        acetaminophen   Tablet .. 650 milliGRAM(s) Oral every 6 hours PRN  ondansetron Injectable 4 milliGRAM(s) IV Push every 8 hours PRN  oxyCODONE    IR 5 milliGRAM(s) Oral every 6 hours PRN    polyethylene glycol 3350 17 Gram(s) Oral daily  senna 2 Tablet(s) Oral at bedtime          PAST MEDICAL & SURGICAL HISTORY:  Bladder prolapse, female, acquired    Uterine prolapse    Raynaud&#x27;s phenomenon    Carpal tunnel syndrome    IBS (irritable bowel syndrome)    Right shoulder pain  on mobic    S/P sacrocolpopexy    S/P abdominoplasty    S/P ANURADHA-BSO (total abdominal hysterectomy and bilateral salpingo-oophorectomy)    S/P laparoscopic cholecystectomy      S/P laparoscopic appendectomy          FAMILY HISTORY:  FH: lung cancer  Father:     FH: COPD (chronic obstructive pulmonary disease)  Mother: 77y    FH: breast cancer  Mother: 77    FH: atrial fibrillation  Mother: 77        SOCIAL HISTORY:  unchanged    REVIEW OF SYSTEMS:  CONSTITUTIONAL: No fever, weight loss, or fatigue  EYES: No eye pain, visual disturbances, or discharge  ENT:  No difficulty hearing, tinnitus, vertigo; No sinus or throat pain  NECK: No pain or stiffness  RESPIRATORY:  +SOB  CARDIOVASCULAR:  No CP  GASTROINTESTINAL: No abdominal or epigastric pain. No nausea, vomiting, or hematemesis; No diarrhea or constipation. No melena or hematochezia.  GENITOURINARY: No dysuria, frequency, hematuria, or incontinence  NEUROLOGICAL: No headaches, memory loss, loss of strength, numbness, or tremors  SKIN: No rashes  LYMPH Nodes: No enlarged glands  ENDOCRINE: No heat or cold intolerance; No hair loss  MUSCULOSKELETAL: No joint pain or swelling; No muscle, back, or extremity pain  PSYCHIATRIC: No depression, anxiety, mood swings, or difficulty sleeping  HEME/LYMPH: No easy bruising, or bleeding gums  ALLERGY AND IMMUNOLOGIC: No hives or eczema	    [ x] All others negative	  [ ] Unable to obtain    PHYSICAL EXAM:  T(C): 36.7 (20 @ 06:31), Max: 37.3 (20 @ 11:59)  HR: 91 (20 @ 06:31) (74 - 91)  BP: 106/74 (20 @ 06:31) (99/65 - 123/78)  RR: 18 (20 @ 06:31) (18 - 20)  SpO2: 94% (20 @ 06:31) (94% - 99%)  Wt(kg): --  I&O's Summary    20 Dec 2020 07:01  -  21 Dec 2020 07:00  --------------------------------------------------------  IN: 860 mL / OUT: 2 mL / NET: 858 mL        Appearance:  	No acute distress  HEENT:   Normal oral mucosa, PERRL, EOMI	  Cardiovascular:  RRR, no murmurs  Respiratory:  	CTAB, no respiratory disress  Gastrointestinal:  Soft, well healed surgical scar on abdomen  Skin: No rashes, No ecchymoses, No cyanosis	  Neurologic:  AOx3  Extremities:  no CAMPBELL  Vascular: bilateral DP pulses palpable    LABS:	 	    CBC Full  -  ( 21 Dec 2020 06:46 )  WBC Count : 11.13 K/uL  Hemoglobin : 12.0 g/dL  Hematocrit : 36.7 %  Platelet Count - Automated : 190 K/uL  Mean Cell Volume : 88.6 fl  Mean Cell Hemoglobin : 29.0 pg  Mean Cell Hemoglobin Concentration : 32.7 gm/dL  Auto Neutrophil # : x  Auto Lymphocyte # : x  Auto Monocyte # : x  Auto Eosinophil # : x  Auto Basophil # : x  Auto Neutrophil % : x  Auto Lymphocyte % : x  Auto Monocyte % : x  Auto Eosinophil % : x  Auto Basophil % : x        140  |  106  |  8   ----------------------------<  155<H>  4.2   |  23  |  0.64      137  |  105  |  9   ----------------------------<  107<H>  3.8   |  22  |  0.66    Ca    9.3      20 Dec 2020 04:24  Ca    9.1      19 Dec 2020 19:35  Phos  3.5     -  Mg     2.2     -  Mg     2.0     -    TPro  7.4  /  Alb  3.5  /  TBili  0.5  /  DBili  x   /  AST  17  /  ALT  21  /  AlkPhos  58  -    CT C/A/P 20  1. Acute segmental and subsegmental pulmonary emboli in the right lower lobe with developing pulmonary infarct in the posterior basilar segment.  2. No evidence of right heart strain.  3. Surgical changes from recent abdominoplasty and supracervical hysterectomy. There is an additional 2.6 cm organized fluid collection in the anterior abdominal wall inseparable from the left rectus abdominis, probably represents a degenerating hematoma or organized seroma. Differential also includes abdominal wall endometriosis in the appropriate clinical setting.    LE duplex 20  No DVT    Assessment:  56 yo F with hx of ANURADHA/BSO with abdominoplasty, lipo, sacrocolpopexy on 20 who presents with SOB and chest pain, found to have RLL PE. Hemodynamically stable, bnp/trop negative, but remains symptomatic w/ chest/back pain.    1. RLL PE - unclear if clearly provoked given she was 3-4 post op and has significant family history       Plan:  1. F/u echo  2. Obtain labs: Lupus anticoagulant, Anticardiolipin Ab, Anti B2 glycoprotein, Antione Venom Viper test, Factor V Leiden, Prothrombin gene mutation   3. C/w apixaban 10mg BID x1 week, followed by 5 mg BID  4. Okay to d/c, will follow up in outpatient vascular clinic with Dr. Zhao      Thank you      Vascular Cardiology Service    Please call with any questions:   DIRECT SERVICE NUMBER:  [phone]  Office [phone]  email:  [email or web address]     Vascular Cardiology Consult Note    DIRECT SERVICE NUMBER:  [phone]           EMAIL [email or web address]   OFFICE [phone]    CC:  SOB    HPI:    54 yo F with hx of ANURADHA/BSO with abdominoplasty, lipo, sacrocolpopexy on 20 presented with SOB and chest pain. She saw her plastic surgeon on 20, had some minimal fluid drained from abdominal surgical site. That evening, started having R lower back pain, which the started radiating to her R shoulder and R anterior chest as the night progressed. The pain woke her up in the middle of the night and she noted worsening pain with deep breaths. She took Mobic and aleve overnight but the pain continued in the morning along with new SOB with minimal exertion. She had no CAMPBELL or calf pain. She presented to the ED and was found to have a RLL PE and started on heparin gtt. She has been switched to apixaban since last night. Overall her breathing has improved but notes that the right low back pain is now traveling up to mid back and she feels that taking a deep breath now causes more pain. She has a family history w/ mother having blood clots on warfarin. No personal history of clotting disorder. No recent illness. For the first week after surgery patient was bedbound but since then she has been walking around the house.       Allergies    No Known Allergies    Intolerances    	    MEDICATIONS:  apixaban 10 milliGRAM(s) Oral every 12 hours        acetaminophen   Tablet .. 650 milliGRAM(s) Oral every 6 hours PRN  ondansetron Injectable 4 milliGRAM(s) IV Push every 8 hours PRN  oxyCODONE    IR 5 milliGRAM(s) Oral every 6 hours PRN    polyethylene glycol 3350 17 Gram(s) Oral daily  senna 2 Tablet(s) Oral at bedtime          PAST MEDICAL & SURGICAL HISTORY:  Bladder prolapse, female, acquired    Uterine prolapse    Raynaud&#x27;s phenomenon    Carpal tunnel syndrome    IBS (irritable bowel syndrome)    Right shoulder pain  on mobic    S/P sacrocolpopexy    S/P abdominoplasty    S/P ANURADHA-BSO (total abdominal hysterectomy and bilateral salpingo-oophorectomy)    S/P laparoscopic cholecystectomy      S/P laparoscopic appendectomy          FAMILY HISTORY:  FH: lung cancer  Father:     FH: COPD (chronic obstructive pulmonary disease)  Mother: 77y    FH: breast cancer  Mother: 77    FH: atrial fibrillation  Mother: 77        SOCIAL HISTORY:  unchanged    REVIEW OF SYSTEMS:  CONSTITUTIONAL: No fever, weight loss, or fatigue  EYES: No eye pain, visual disturbances, or discharge  ENT:  No difficulty hearing, tinnitus, vertigo; No sinus or throat pain  NECK: No pain or stiffness  RESPIRATORY:  +SOB  CARDIOVASCULAR:  No CP  GASTROINTESTINAL: No abdominal or epigastric pain. No nausea, vomiting, or hematemesis; No diarrhea or constipation. No melena or hematochezia.  GENITOURINARY: No dysuria, frequency, hematuria, or incontinence  NEUROLOGICAL: No headaches, memory loss, loss of strength, numbness, or tremors  SKIN: No rashes  LYMPH Nodes: No enlarged glands  ENDOCRINE: No heat or cold intolerance; No hair loss  MUSCULOSKELETAL: No joint pain or swelling; No muscle, back, or extremity pain  PSYCHIATRIC: No depression, anxiety, mood swings, or difficulty sleeping  HEME/LYMPH: No easy bruising, or bleeding gums  ALLERGY AND IMMUNOLOGIC: No hives or eczema	    [ x] All others negative	  [ ] Unable to obtain    PHYSICAL EXAM:  T(C): 36.7 (20 @ 06:31), Max: 37.3 (20 @ 11:59)  HR: 91 (20 @ 06:31) (74 - 91)  BP: 106/74 (20 @ 06:31) (99/65 - 123/78)  RR: 18 (20 @ 06:31) (18 - 20)  SpO2: 94% (20 @ 06:31) (94% - 99%)  Wt(kg): --  I&O's Summary    20 Dec 2020 07:01  -  21 Dec 2020 07:00  --------------------------------------------------------  IN: 860 mL / OUT: 2 mL / NET: 858 mL        Appearance:  	No acute distress  HEENT:   Normal oral mucosa, PERRL, EOMI	  Cardiovascular:  RRR, no murmurs  Respiratory:  	CTAB, no respiratory disress  Gastrointestinal:  Soft, well healed surgical scar on abdomen  Skin: No rashes, No ecchymoses, No cyanosis	  Neurologic:  AOx3  Extremities:  no CAMPBELL  Vascular: bilateral DP pulses palpable    LABS:	 	    CBC Full  -  ( 21 Dec 2020 06:46 )  WBC Count : 11.13 K/uL  Hemoglobin : 12.0 g/dL  Hematocrit : 36.7 %  Platelet Count - Automated : 190 K/uL  Mean Cell Volume : 88.6 fl  Mean Cell Hemoglobin : 29.0 pg  Mean Cell Hemoglobin Concentration : 32.7 gm/dL  Auto Neutrophil # : x  Auto Lymphocyte # : x  Auto Monocyte # : x  Auto Eosinophil # : x  Auto Basophil # : x  Auto Neutrophil % : x  Auto Lymphocyte % : x  Auto Monocyte % : x  Auto Eosinophil % : x  Auto Basophil % : x        140  |  106  |  8   ----------------------------<  155<H>  4.2   |  23  |  0.64      137  |  105  |  9   ----------------------------<  107<H>  3.8   |  22  |  0.66    Ca    9.3      20 Dec 2020 04:24  Ca    9.1      19 Dec 2020 19:35  Phos  3.5     -  Mg     2.2     -  Mg     2.0     -    TPro  7.4  /  Alb  3.5  /  TBili  0.5  /  DBili  x   /  AST  17  /  ALT  21  /  AlkPhos  58  -    CT C/A/P 20  1. Acute segmental and subsegmental pulmonary emboli in the right lower lobe with developing pulmonary infarct in the posterior basilar segment.  2. No evidence of right heart strain.  3. Surgical changes from recent abdominoplasty and supracervical hysterectomy. There is an additional 2.6 cm organized fluid collection in the anterior abdominal wall inseparable from the left rectus abdominis, probably represents a degenerating hematoma or organized seroma. Differential also includes abdominal wall endometriosis in the appropriate clinical setting.    LE duplex 20  No DVT    Assessment:  54 yo F with hx of ANURADHA/BSO with abdominoplasty, lipo, sacrocolpopexy on 20 who presents with SOB and chest pain, found to have RLL PE. Hemodynamically stable, bnp/trop negative, but remains symptomatic w/ chest/back pain.    1. RLL PE - unclear if clearly provoked given she was 3-4 post op and has significant family history       Plan:  1. F/u echo  2. Obtain labs: Lupus anticoagulant, Anticardiolipin Ab, Anti B2 glycoprotein, Dilute Antione Venom Viper test, Silica clotting time, Factor V Leiden, Prothrombin gene mutation   3. C/w apixaban 10mg BID x1 week, followed by 5 mg BID  4. Okay to d/c, will follow up in outpatient vascular clinic with Dr. Zhao      Thank you      Vascular Cardiology Service    Please call with any questions:   DIRECT SERVICE NUMBER:  [phone]  Office [phone]  email:  [email or web address]

## 2020-12-21 NOTE — DISCHARGE NOTE PROVIDER - NSDCFUSCHEDAPPT_GEN_ALL_CORE_FT
JOSELITO CHIU ; 01/08/2021 ; NPP Urogyn [address] [place] [address] JOSELITO CHIU ; 01/08/2021 ; [facility]PP Urogyn [facility]  JOSELITO CHIU ; 01/15/2021 ; [facility] Cardio [address]

## 2020-12-21 NOTE — PROGRESS NOTE ADULT - ASSESSMENT
55F with PMHof IBS, Reynaud's, s/p ANURADHA/BSO with abdominoplasty, lipo, and sacrocolpopexy on 11/24/20 p/w SOB and chest pain, found to have RLL PE. 
55F with PMHof IBS, Reynaud's, s/p ANURADHA/BSO with abdominoplasty, lipo, and sacrocolpopexy on 11/24/20 p/w SOB and chest pain, found to have RLL PE.

## 2020-12-21 NOTE — PROGRESS NOTE ADULT - SUBJECTIVE AND OBJECTIVE BOX
PROGRESS NOTE:     Patient is a 55y old  Female who presents with a chief complaint of SOB (21 Dec 2020 12:37)      SUBJECTIVE / OVERNIGHT EVENTS:  Still having some pleuritic chest/back pain that moved from lower back to midback.    ADDITIONAL REVIEW OF SYSTEMS:  No fever or chills  NO n/v/d    MEDICATIONS  (STANDING):  apixaban 10 milliGRAM(s) Oral every 12 hours  lactobacillus acidophilus 1 Tablet(s) Oral daily  polyethylene glycol 3350 17 Gram(s) Oral daily  senna 2 Tablet(s) Oral at bedtime    MEDICATIONS  (PRN):  acetaminophen  IVPB .. 1000 milliGRAM(s) IV Intermittent every 6 hours PRN Mild Pain (1 - 3), Moderate Pain (4 - 6)  ondansetron Injectable 4 milliGRAM(s) IV Push every 8 hours PRN Nausea and/or Vomiting  oxyCODONE    IR 5 milliGRAM(s) Oral every 6 hours PRN moderate to severe pain      CAPILLARY BLOOD GLUCOSE        I&O's Summary    20 Dec 2020 07:01  -  21 Dec 2020 07:00  --------------------------------------------------------  IN: 860 mL / OUT: 2 mL / NET: 858 mL        PHYSICAL EXAM:  Vital Signs Last 24 Hrs  T(C): 37.4 (21 Dec 2020 12:31), Max: 37.4 (21 Dec 2020 12:31)  T(F): 99.3 (21 Dec 2020 12:31), Max: 99.3 (21 Dec 2020 12:31)  HR: 93 (21 Dec 2020 12:31) (74 - 93)  BP: 102/64 (21 Dec 2020 12:31) (100/68 - 123/78)  BP(mean): --  RR: 18 (21 Dec 2020 12:31) (18 - 20)  SpO2: 96% (21 Dec 2020 12:31) (94% - 98%)    CONSTITUTIONAL: NAD, well-developed; non toxic; walking around  RESPIRATORY: Normal respiratory effort; lungs are clear to auscultation bilaterally  CARDIOVASCULAR: Regular rate and rhythm, normal S1 and S2, no murmur/rub/gallop; No lower extremity edema; Peripheral pulses are 2+ bilaterally  ABDOMEN: Nontender to palpation, normoactive bowel sounds, no rebound/guarding; No hepatosplenomegaly  MUSCLOSKELETAL: no clubbing or cyanosis of digits; no joint swelling or tenderness to palpation  PSYCH: A+O to person, place, and time; affect appropriate    LABS:                        12.0   11.13 )-----------( 190      ( 21 Dec 2020 06:46 )             36.7     12-20    140  |  106  |  8   ----------------------------<  155<H>  4.2   |  23  |  0.64    Ca    9.3      20 Dec 2020 04:24  Phos  3.5     12-20  Mg     2.2     12-20    TPro  7.4  /  Alb  3.5  /  TBili  0.5  /  DBili  x   /  AST  17  /  ALT  21  /  AlkPhos  58  12-19    PT/INR - ( 20 Dec 2020 04:24 )   PT: 14.7 sec;   INR: 1.24 ratio         PTT - ( 20 Dec 2020 20:27 )  PTT:48.5 sec      Urinalysis Basic - ( 19 Dec 2020 19:15 )    Color: Yellow / Appearance: Clear / S.005 / pH: x  Gluc: x / Ketone: Negative  / Bili: Negative / Urobili: Negative mg/dL   Blood: x / Protein: Negative mg/dL / Nitrite: Negative   Leuk Esterase: Negative / RBC: x / WBC x   Sq Epi: x / Non Sq Epi: x / Bacteria: x          RADIOLOGY & ADDITIONAL TESTS:  Results Reviewed:   Imaging Personally Reviewed:  Electrocardiogram Personally Reviewed:    COORDINATION OF CARE:  Care Discussed with Consultants/Other Providers [Y/N]:  Prior or Outpatient Records Reviewed [Y/N]:

## 2020-12-21 NOTE — PROGRESS NOTE ADULT - PROBLEM SELECTOR PLAN 1
acute SOB and R lower back and R shoulder/anterior chest pain, found with RLL PE with pulm infarct, in setting of recent surgery, likely provoked  - TTE completed awaiting result  -Eliquis 10mg PO BID 12/20 6pm- 12/27 then 5mg PO BID after that- script sent to pharmacy  -US pelvis still pending

## 2020-12-21 NOTE — DISCHARGE NOTE PROVIDER - CARE PROVIDER_API CALL
Lawrence Zhao (DO)  Cardiovascular Disease; Internal Medicine; Nuclear Cardiology  [address] [place] [address]  Phone: [phone]  Fax: [phone]  Follow Up Time:

## 2020-12-21 NOTE — DISCHARGE NOTE PROVIDER - NSDCCPCAREPLAN_GEN_ALL_CORE_FT
PRINCIPAL DISCHARGE DIAGNOSIS  Diagnosis: Pulmonary embolism  Assessment and Plan of Treatment: Take Eliquis 10mg twice per day until 12/28 then switch to 5mg twice per day.  Follow up with Dr. Zhao at your scheduled appointment for hypercoagulable lab follow up .      SECONDARY DISCHARGE DIAGNOSES  Diagnosis: Fluid collection at surgical site  Assessment and Plan of Treatment: Fluid collection at surgical site  GYN and plastic surgery reviewed your imaging and found no need for intervention so you can follow up with them as before in the office     PRINCIPAL DISCHARGE DIAGNOSIS  Diagnosis: Pulmonary embolism  Assessment and Plan of Treatment: Take Eliquis 10mg twice per day until 12/27 am  then switch to 5mg twice per day starting 12/27 pm. (started on 12/20 pm, needs to take total of 14 doses of 10 mg) Follow up with Dr. Zhao at your scheduled appointment for hypercoagulable lab follow up .      SECONDARY DISCHARGE DIAGNOSES  Diagnosis: Fluid collection at surgical site  Assessment and Plan of Treatment: Fluid collection at surgical site  GYN and plastic surgery reviewed your imaging and found no need for intervention so you can follow up with them as before in the office

## 2020-12-21 NOTE — DISCHARGE NOTE PROVIDER - NSRESEARCHGRANT_PROPHYLAXISRECOMFT_GEN_A_CORE
No post-discharge thromboprophylaxis indicated. Rivaroxaban 10 mg oral tablet: 1 tab orally once a day for 30 days

## 2020-12-21 NOTE — PROVIDER CONTACT NOTE (OTHER) - BACKGROUND
Pt admitted with a PE
Pt admitted with PE. Last dose of zofran at 23:30.
Pt admitted with a PE. Received tylenol with no relief. Pt has oxycodone ordered but it made her nauseous.

## 2020-12-21 NOTE — PROGRESS NOTE ADULT - PROBLEM SELECTOR PLAN 2
CT A/P noted with 2.6 cm fluid collection in the anterior abdominal wall, likely degenerating hematoma or organized seroma  - low suspicion for infection given no abdominal pain and benign exam   - seen by plastics Dr. Pramod Martinez) bedside, rec no intervention, no s/s of infection  - c/w abdominal binder   -F/U as outpt

## 2020-12-21 NOTE — DISCHARGE NOTE PROVIDER - NSDCFUADDAPPT_GEN_ALL_CORE_FT
Dr. Zhao appt on January 15th 8:20am   on 1st floor of [facility] cardiology office

## 2020-12-22 ENCOUNTER — TRANSCRIPTION ENCOUNTER (OUTPATIENT)
Age: 55
End: 2020-12-22

## 2020-12-22 VITALS
RESPIRATION RATE: 18 BRPM | DIASTOLIC BLOOD PRESSURE: 74 MMHG | OXYGEN SATURATION: 94 % | TEMPERATURE: 98 F | SYSTOLIC BLOOD PRESSURE: 112 MMHG | HEART RATE: 84 BPM

## 2020-12-22 LAB
B2 GLYCOPROT1 AB SER QL: NEGATIVE — SIGNIFICANT CHANGE UP
CARDIOLIPIN AB SER-ACNC: NEGATIVE — SIGNIFICANT CHANGE UP
DRVVT 50/50: 47.7 SEC — SIGNIFICANT CHANGE UP
DRVVT 50/50: 48 SEC — SIGNIFICANT CHANGE UP
DRVVT SCREEN TO CONFIRM RATIO: SIGNIFICANT CHANGE UP
DRVVT SCREEN TO CONFIRM RATIO: SIGNIFICANT CHANGE UP
HCT VFR BLD CALC: 33.6 % — LOW (ref 34.5–45)
HGB BLD-MCNC: 11.1 G/DL — LOW (ref 11.5–15.5)
LA NT DPL PPP QL: 55.6 SEC — SIGNIFICANT CHANGE UP
LA NT DPL PPP QL: 63.2 SEC — SIGNIFICANT CHANGE UP
MCHC RBC-ENTMCNC: 28.7 PG — SIGNIFICANT CHANGE UP (ref 27–34)
MCHC RBC-ENTMCNC: 33 GM/DL — SIGNIFICANT CHANGE UP (ref 32–36)
MCV RBC AUTO: 86.8 FL — SIGNIFICANT CHANGE UP (ref 80–100)
NORMALIZED SCT PPP-RTO: 0.94 RATIO — SIGNIFICANT CHANGE UP (ref 0–1.16)
NORMALIZED SCT PPP-RTO: 0.95 RATIO — SIGNIFICANT CHANGE UP (ref 0–1.16)
NORMALIZED SCT PPP-RTO: SIGNIFICANT CHANGE UP
NORMALIZED SCT PPP-RTO: SIGNIFICANT CHANGE UP
NRBC # BLD: 0 /100 WBCS — SIGNIFICANT CHANGE UP (ref 0–0)
PLATELET # BLD AUTO: 223 K/UL — SIGNIFICANT CHANGE UP (ref 150–400)
RBC # BLD: 3.87 M/UL — SIGNIFICANT CHANGE UP (ref 3.8–5.2)
RBC # FLD: 13 % — SIGNIFICANT CHANGE UP (ref 10.3–14.5)
WBC # BLD: 10.87 K/UL — HIGH (ref 3.8–10.5)
WBC # FLD AUTO: 10.87 K/UL — HIGH (ref 3.8–10.5)

## 2020-12-22 PROCEDURE — 93005 ELECTROCARDIOGRAM TRACING: CPT

## 2020-12-22 PROCEDURE — 71275 CT ANGIOGRAPHY CHEST: CPT

## 2020-12-22 PROCEDURE — 74177 CT ABD & PELVIS W/CONTRAST: CPT

## 2020-12-22 PROCEDURE — 81025 URINE PREGNANCY TEST: CPT

## 2020-12-22 PROCEDURE — 86769 SARS-COV-2 COVID-19 ANTIBODY: CPT

## 2020-12-22 PROCEDURE — 85610 PROTHROMBIN TIME: CPT

## 2020-12-22 PROCEDURE — 86146 BETA-2 GLYCOPROTEIN ANTIBODY: CPT

## 2020-12-22 PROCEDURE — 96376 TX/PRO/DX INJ SAME DRUG ADON: CPT

## 2020-12-22 PROCEDURE — 86147 CARDIOLIPIN ANTIBODY EA IG: CPT

## 2020-12-22 PROCEDURE — 83880 ASSAY OF NATRIURETIC PEPTIDE: CPT

## 2020-12-22 PROCEDURE — 99285 EMERGENCY DEPT VISIT HI MDM: CPT

## 2020-12-22 PROCEDURE — 85379 FIBRIN DEGRADATION QUANT: CPT

## 2020-12-22 PROCEDURE — 96374 THER/PROPH/DIAG INJ IV PUSH: CPT

## 2020-12-22 PROCEDURE — 99239 HOSP IP/OBS DSCHRG MGMT >30: CPT

## 2020-12-22 PROCEDURE — 80048 BASIC METABOLIC PNL TOTAL CA: CPT

## 2020-12-22 PROCEDURE — 85025 COMPLETE CBC W/AUTO DIFF WBC: CPT

## 2020-12-22 PROCEDURE — 36415 COLL VENOUS BLD VENIPUNCTURE: CPT

## 2020-12-22 PROCEDURE — 84484 ASSAY OF TROPONIN QUANT: CPT

## 2020-12-22 PROCEDURE — 85027 COMPLETE CBC AUTOMATED: CPT

## 2020-12-22 PROCEDURE — 81241 F5 GENE: CPT

## 2020-12-22 PROCEDURE — 96375 TX/PRO/DX INJ NEW DRUG ADDON: CPT

## 2020-12-22 PROCEDURE — 81003 URINALYSIS AUTO W/O SCOPE: CPT

## 2020-12-22 PROCEDURE — 84100 ASSAY OF PHOSPHORUS: CPT

## 2020-12-22 PROCEDURE — 83735 ASSAY OF MAGNESIUM: CPT

## 2020-12-22 PROCEDURE — 83690 ASSAY OF LIPASE: CPT

## 2020-12-22 PROCEDURE — 81240 F2 GENE: CPT

## 2020-12-22 PROCEDURE — U0003: CPT

## 2020-12-22 PROCEDURE — 93970 EXTREMITY STUDY: CPT

## 2020-12-22 PROCEDURE — 85730 THROMBOPLASTIN TIME PARTIAL: CPT

## 2020-12-22 PROCEDURE — 80053 COMPREHEN METABOLIC PANEL: CPT

## 2020-12-22 PROCEDURE — C8929: CPT

## 2020-12-22 RX ORDER — OXYCODONE HYDROCHLORIDE 5 MG/1
1 TABLET ORAL
Qty: 20 | Refills: 0
Start: 2020-12-22 | End: 2020-12-26

## 2020-12-22 RX ORDER — DOCUSATE SODIUM 100 MG
0 CAPSULE ORAL
Qty: 0 | Refills: 0 | DISCHARGE

## 2020-12-22 RX ORDER — METOCLOPRAMIDE HCL 10 MG
1 TABLET ORAL
Qty: 9 | Refills: 0
Start: 2020-12-22 | End: 2020-12-24

## 2020-12-22 RX ORDER — APIXABAN 2.5 MG/1
2 TABLET, FILM COATED ORAL
Qty: 80 | Refills: 0
Start: 2020-12-22

## 2020-12-22 RX ADMIN — APIXABAN 10 MILLIGRAM(S): 2.5 TABLET, FILM COATED ORAL at 08:05

## 2020-12-22 RX ADMIN — Medication 400 MILLIGRAM(S): at 04:20

## 2020-12-22 RX ADMIN — Medication 1000 MILLIGRAM(S): at 04:45

## 2020-12-22 NOTE — PROGRESS NOTE ADULT - SUBJECTIVE AND OBJECTIVE BOX
Vascular Cardiology  Progress note  DIRECT SERVICE NUMBER: [phone]            EMAIL [email or web address]   OFFICE [phone]    CC:  SOB    INTERVAL HISTORY:  No events overnight.         Allergies    No Known Allergies    Intolerances    	    MEDICATIONS:  apixaban 10 milliGRAM(s) Oral every 12 hours  acetaminophen  IVPB .. 1000 milliGRAM(s) IV Intermittent every 6 hours PRN  ondansetron Injectable 4 milliGRAM(s) IV Push every 8 hours PRN  oxyCODONE    IR 5 milliGRAM(s) Oral every 6 hours PRN    polyethylene glycol 3350 17 Gram(s) Oral daily  senna 2 Tablet(s) Oral at bedtime          PAST MEDICAL & SURGICAL HISTORY:  Bladder prolapse, female, acquired    Uterine prolapse    Raynaud&#x27;s phenomenon    Carpal tunnel syndrome    IBS (irritable bowel syndrome)    Right shoulder pain  on mobic    S/P sacrocolpopexy    S/P abdominoplasty    S/P ANURADHA-BSO (total abdominal hysterectomy and bilateral salpingo-oophorectomy)    S/P laparoscopic cholecystectomy      S/P laparoscopic appendectomy          FAMILY HISTORY:  FH: lung cancer  Father:     FH: COPD (chronic obstructive pulmonary disease)  Mother: 77y    FH: breast cancer  Mother: 77    FH: atrial fibrillation  Mother: 77        SOCIAL HISTORY:  unchanged    REVIEW OF SYSTEMS:  CONSTITUTIONAL: No fever, weight loss, or fatigue  EYES: No eye pain, visual disturbances, or discharge  ENMT:  No difficulty hearing, tinnitus, vertigo; No sinus or throat pain  NECK: No pain or stiffness  RESPIRATORY: No cough, wheezing, chills or hemoptysis; No Shortness of Breath  CARDIOVASCULAR: No chest pain, palpitations, passing out, dizziness, or leg swelling  GASTROINTESTINAL: No abdominal or epigastric pain. No nausea, vomiting, or hematemesis; No diarrhea or constipation. No melena or hematochezia.  GENITOURINARY: No dysuria, frequency, hematuria, or incontinence  NEUROLOGICAL: No headaches, memory loss, loss of strength, numbness, or tremors  SKIN: No itching, burning, rashes, or lesions   LYMPH Nodes: No enlarged glands  ENDOCRINE: No heat or cold intolerance; No hair loss  MUSCULOSKELETAL: No joint pain or swelling; No muscle, back, or extremity pain  PSYCHIATRIC: No depression, anxiety, mood swings, or difficulty sleeping  HEME/LYMPH: No easy bruising, or bleeding gums  ALLERY AND IMMUNOLOGIC: No hives or eczema	    [ x] All others negative	  [ ] Unable to obtain    PHYSICAL EXAM:  T(C): 36.6 (20 @ 04:27), Max: 37.4 (20 @ 12:31)  HR: 84 (20 @ 04:27) (84 - 93)  BP: 112/74 (20 @ 04:27) (102/64 - 120/76)  RR: 18 (20 @ 04:27) (18 - 18)  SpO2: 94% (20 @ 04:27) (94% - 96%)  Wt(kg): --  I&O's Summary      Appearance: Normal	  HEENT:   Normal oral mucosa, PERRL, EOMI  Cardiovascular: Normal S1 S2, No JVD, No murmurs, No edema  Respiratory: Lungs clear to auscultation	  Psychiatry: A & O x 3, Mood & affect appropriate  Gastrointestinal:  Soft, Non-tender, + BS	  Skin: No rashes, No ecchymoses, No cyanosis	  Neurologic: Non-focal  Extremities: Normal range of motion, No clubbing, cyanosis.  Vascular: bilateral 2+ DP pulses      LABS:	 	    CBC Full  -  ( 22 Dec 2020 06:48 )  WBC Count : 10.87 K/uL  Hemoglobin : 11.1 g/dL  Hematocrit : 33.6 %  Platelet Count - Automated : 223 K/uL  Mean Cell Volume : 86.8 fl  Mean Cell Hemoglobin : 28.7 pg  Mean Cell Hemoglobin Concentration : 33.0 gm/dL  Auto Neutrophil # : x  Auto Lymphocyte # : x  Auto Monocyte # : x  Auto Eosinophil # : x  Auto Basophil # : x  Auto Neutrophil % : x  Auto Lymphocyte % : x  Auto Monocyte % : x  Auto Eosinophil % : x  Auto Basophil % : x    Echo 20  1. Endocardial visualization enhanced with intravenous  injection of Ultrasonic Enhancing Agent (Definity).  Normal  left ventricular systolic function. No segmental wall  motion abnormalities.  2. The right ventricle is not well visualized; grossly  normal right ventricular systolic function.  *** No previous Echo exam.      Assessment:  54 yo F with hx of ANURADHA/BSO with abdominoplasty, lipo, sacrocolpopexy on 20 who presents with SOB and chest pain, found to have RLL PE. Hemodynamically stable, bnp/trop negative, but remains symptomatic w/ chest/back pain.    1. RLL PE - unclear if clearly provoked given she was 3-4 post op and has significant family history       Plan:  1. Please also obtain dilute Antione Venom Viper test, Silica clotting time before discharge if possible  2. Ok to d/c, will follow up in outpatient vascular clinic with Dr. Zhao      Thank you      Vascular Cardiology Service  DIRECT SERVICE NUMBER [phone]  Office [phone]  email:   [email or web address]

## 2020-12-22 NOTE — DISCHARGE NOTE NURSING/CASE MANAGEMENT/SOCIAL WORK - PATIENT PORTAL LINK FT
You can access the FollowMyHealth Patient Portal offered by [facility] by registering at the following website: [email or web address]. By joining [employer]’s FollowMyHealth portal, you will also be able to view your health information using other applications (apps) compatible with our system.

## 2020-12-23 LAB
CARDIOLIPIN AB SER-ACNC: NEGATIVE — SIGNIFICANT CHANGE UP
PTR INTERPRETATION: SIGNIFICANT CHANGE UP

## 2020-12-25 LAB — DNA PLOIDY SPEC FC-IMP: SIGNIFICANT CHANGE UP

## 2021-01-08 ENCOUNTER — APPOINTMENT (OUTPATIENT)
Dept: UROGYNECOLOGY | Facility: CLINIC | Age: 56
End: 2021-01-08
Payer: COMMERCIAL

## 2021-01-08 ENCOUNTER — APPOINTMENT (OUTPATIENT)
Dept: ULTRASOUND IMAGING | Facility: CLINIC | Age: 56
End: 2021-01-08
Payer: COMMERCIAL

## 2021-01-08 ENCOUNTER — OUTPATIENT (OUTPATIENT)
Dept: OUTPATIENT SERVICES | Facility: HOSPITAL | Age: 56
LOS: 1 days | End: 2021-01-08
Payer: COMMERCIAL

## 2021-01-08 DIAGNOSIS — Z98.890 OTHER SPECIFIED POSTPROCEDURAL STATES: Chronic | ICD-10-CM

## 2021-01-08 DIAGNOSIS — Z90.710 ACQUIRED ABSENCE OF BOTH CERVIX AND UTERUS: Chronic | ICD-10-CM

## 2021-01-08 DIAGNOSIS — Z90.49 ACQUIRED ABSENCE OF OTHER SPECIFIED PARTS OF DIGESTIVE TRACT: Chronic | ICD-10-CM

## 2021-01-08 DIAGNOSIS — Z98.890 OTHER SPECIFIED POSTPROCEDURAL STATES: ICD-10-CM

## 2021-01-08 DIAGNOSIS — E06.9 THYROIDITIS, UNSPECIFIED: ICD-10-CM

## 2021-01-08 PROCEDURE — 51701 INSERT BLADDER CATHETER: CPT | Mod: 79

## 2021-01-08 PROCEDURE — 76536 US EXAM OF HEAD AND NECK: CPT

## 2021-01-08 PROCEDURE — 99024 POSTOP FOLLOW-UP VISIT: CPT

## 2021-01-08 PROCEDURE — 76536 US EXAM OF HEAD AND NECK: CPT | Mod: 26

## 2021-01-08 NOTE — SUBJECTIVE
[FreeTextEntry1] : no fever/chills, no chest pain - resolved since on Eliquis, no vag disch or bleeding [FreeTextEntry7] : no abd pain [FreeTextEntry6] : normal no N/V [FreeTextEntry5] : 1 or 2 episodes 1 drop leak with full bladder and positional change, no dysuria, no gross hematuria, no incomplete emptying, no urgency frequency [FreeTextEntry4] : normal without straining or blood, diarrhea last Sun twice along with vomiting once which she attributes to eating old pepperoni; resolved since; known IBS diarrhea predominant [FreeTextEntry3] : normal [FreeTextEntry2] : normal, still a little sore to sleep on sides as opposed to on back

## 2021-01-08 NOTE — OBJECTIVE
[Post Void Residual ____ ml] : Post Void Residual was [unfilled] ml [Soft and Nontender] : soft and nontender [Good Support] : Good support [Healing well] : healing well [No Masses or Tenderness] : no masses or tenderness [FreeTextEntry3] : A -3 / C - 8 / TVL 8 / P -3, no mesh exposure, normal; no external hemarrhoids or anal lesion

## 2021-01-08 NOTE — DISCUSSION/SUMMARY
[FreeTextEntry1] : 6 weeks postop s/p exlap THAD / BS / ASC / cysto, combo procedure with Dr. Martinez of plastics who performed an abdominoplasty. Was doing well and appropriately at 2 week postop visit. 3.5 weeks postop, suffered PE and is now on anticoagulation Eliquis. Has followup with [facility] 1/12/21, continues to follow with plastics. Urogyn exam today normal, PVR normal, Vag exam good no POP and no mesh exposure. Bladder training and kegels prn. RTO 2 months or prn. Cleared for sexual activity if desired. All ques answered.\par

## 2021-01-12 ENCOUNTER — APPOINTMENT (OUTPATIENT)
Dept: CARDIOLOGY | Facility: CLINIC | Age: 56
End: 2021-01-12
Payer: COMMERCIAL

## 2021-01-12 VITALS
HEIGHT: 62 IN | DIASTOLIC BLOOD PRESSURE: 72 MMHG | BODY MASS INDEX: 28.52 KG/M2 | SYSTOLIC BLOOD PRESSURE: 122 MMHG | HEART RATE: 86 BPM | OXYGEN SATURATION: 96 % | RESPIRATION RATE: 16 BRPM | WEIGHT: 155 LBS

## 2021-01-12 PROCEDURE — 99072 ADDL SUPL MATRL&STAF TM PHE: CPT

## 2021-01-12 PROCEDURE — 99214 OFFICE O/P EST MOD 30 MIN: CPT

## 2021-01-12 NOTE — ASSESSMENT
[FreeTextEntry1] : 54 yo F with hx of ANURADHA/BSO with abdominoplasty, lipo, sacrocolpopexy on\par 11/24/20 who presents with SOB and chest pain, found to have RLL PE.\par Hemodynamically stable, bnp/trop negative, but remains symptomatic w/\par chest/back pain.\par \par 1. RLL PE - unclear if clearly provoked given she was 3-4 post op and has\par significant family history\par \par \par Plan:\par 1.  Continue eliquis 5mg BID for now\par 2.  Return for labwork March 19th, including d-dimer.  If dimer is negative then would reduce dose to eliquis to 2.5mg BID (unclear if truly provoked as more than 2 weeks passed since surgery, will be cautious in her case due to her mothers strong history of clotting)\par 3.  In regards to work, she should limit her activity to light activity as she recovers from her PE.  I anticipate she should be able to return to work February 15th\par

## 2021-01-12 NOTE — PHYSICAL EXAM
[General Appearance - Well Developed] : well developed [Normal Appearance] : normal appearance [Well Groomed] : well groomed [General Appearance - Well Nourished] : well nourished [No Deformities] : no deformities [General Appearance - In No Acute Distress] : no acute distress [Normal Conjunctiva] : the conjunctiva exhibited no abnormalities [Eyelids - No Xanthelasma] : the eyelids demonstrated no xanthelasmas [Normal Oral Mucosa] : normal oral mucosa [No Oral Pallor] : no oral pallor [No Oral Cyanosis] : no oral cyanosis [Normal Jugular Venous A Waves Present] : normal jugular venous A waves present [Normal Jugular Venous V Waves Present] : normal jugular venous V waves present [No Jugular Venous Ko A Waves] : no jugular venous ko A waves [Respiration, Rhythm And Depth] : normal respiratory rhythm and effort [Exaggerated Use Of Accessory Muscles For Inspiration] : no accessory muscle use [Auscultation Breath Sounds / Voice Sounds] : lungs were clear to auscultation bilaterally [Heart Rate And Rhythm] : heart rate and rhythm were normal [Heart Sounds] : normal S1 and S2 [Murmurs] : no murmurs present [Abnormal Walk] : normal gait [Gait - Sufficient For Exercise Testing] : the gait was sufficient for exercise testing [Nail Clubbing] : no clubbing of the fingernails [Cyanosis, Localized] : no localized cyanosis [Petechial Hemorrhages (___cm)] : no petechial hemorrhages [] : no ischemic changes [FreeTextEntry1] : Soft, incision is healing

## 2021-01-12 NOTE — HISTORY OF PRESENT ILLNESS
[FreeTextEntry1] : \par \par \par \par 56 yo F with hx of ANURADHA/BSO with abdominoplasty, lipo, sacrocolpopexy on\par 11/24/20 who presented with SOB and chest pain, found to have RLL PE December 19th\par Hemodynamically stable, bnp/trop negative.  Started on eliquis 5mg BID.  Here for followup visit.  She is recoving at home.  Still with some abdominal tightness at times, is recovering from surgery.  Breathing is ok.  She is slowly increasing her activity at tolerated, but states that she is not ready to return to work - still needs some time to recover.  \par \par \par FVL negative\par Prothrombin gene negative\par B2GP negative\par LA negative\par Anticardiolipin negative\par \par Dimer was elevated 681\par \par \par \par Testing in december 2020\par \par \par IMPRESSION:\par No evidence of deep venous thrombosis in either lower extremity.\par \par IMPRESSION:\par \par 1. Acute segmental and subsegmental pulmonary emboli in the right lower lobe with developing pulmonary infarct in the posterior basilar segment.\par 2. No evidence of right heart strain.\par 3. Surgical changes from recent abdominoplasty and supracervical hysterectomy. There is an additional 2.6 cm organized fluid collection in the anterior abdominal wall inseparable from the left rectus abdominis, probably represents a degenerating hematoma or organized seroma. Differential also includes abdominal wall endometriosis in the appropriate clinical setting.\par \par Findings were discussed with Dr. Figueroa by Dr. Alcala at 8:49 pm with read back confirmation.\par \par

## 2021-02-26 ENCOUNTER — NON-APPOINTMENT (OUTPATIENT)
Age: 56
End: 2021-02-26

## 2021-03-01 LAB
SARS-COV-2 IGG SERPL IA-ACNC: <0.1 INDEX
SARS-COV-2 IGG SERPL QL IA: NEGATIVE

## 2021-03-09 NOTE — PROCEDURE
5 [Straight Catheterization] : insertion of a straight catheter [Urinary Retention] : urinary retention [Stress Incontinence] : stress incontinence [Urinary Frequency] : urinary frequency [Urgent Incontinence] : urgent incontinence [Patient] : the patient [___ Fr Straight Tip] : a [unfilled] in [place] straight tip catheter [None] : there were no complications with the catheter insertion [Clear] : clear [No Complications] : no complications [Tolerated Well] : the patient tolerated the procedure well [Post procedure instructions and information given] : Post procedure instructions and information were given and reviewed with patient. [1] : 1 [FreeTextEntry1] : catheterized in order to obtain uncontaminated specimen

## 2021-03-16 RX ORDER — APIXABAN 5 MG/1
5 TABLET, FILM COATED ORAL
Qty: 60 | Refills: 3 | Status: DISCONTINUED | COMMUNITY
Start: 2021-01-12 | End: 2021-03-16

## 2021-03-22 LAB
ALBUMIN SERPL ELPH-MCNC: 4.2 G/DL
ALP BLD-CCNC: 67 U/L
ALT SERPL-CCNC: 11 U/L
ANION GAP SERPL CALC-SCNC: 8 MMOL/L
AST SERPL-CCNC: 12 U/L
BASOPHILS # BLD AUTO: 0.05 K/UL
BASOPHILS NFR BLD AUTO: 0.4 %
BILIRUB SERPL-MCNC: 0.2 MG/DL
BUN SERPL-MCNC: 13 MG/DL
CALCIUM SERPL-MCNC: 9.7 MG/DL
CHLORIDE SERPL-SCNC: 106 MMOL/L
CO2 SERPL-SCNC: 27 MMOL/L
CREAT SERPL-MCNC: 0.85 MG/DL
DEPRECATED D DIMER PPP IA-ACNC: 166 NG/ML DDU
EOSINOPHIL # BLD AUTO: 0.33 K/UL
EOSINOPHIL NFR BLD AUTO: 2.9 %
GLUCOSE SERPL-MCNC: 93 MG/DL
HCT VFR BLD CALC: 42.4 %
HGB BLD-MCNC: 13.7 G/DL
IMM GRANULOCYTES NFR BLD AUTO: 0.4 %
LYMPHOCYTES # BLD AUTO: 3.79 K/UL
LYMPHOCYTES NFR BLD AUTO: 33.8 %
MAN DIFF?: NORMAL
MCHC RBC-ENTMCNC: 27 PG
MCHC RBC-ENTMCNC: 32.3 GM/DL
MCV RBC AUTO: 83.6 FL
MONOCYTES # BLD AUTO: 0.79 K/UL
MONOCYTES NFR BLD AUTO: 7 %
NEUTROPHILS # BLD AUTO: 6.2 K/UL
NEUTROPHILS NFR BLD AUTO: 55.5 %
PLATELET # BLD AUTO: 259 K/UL
POTASSIUM SERPL-SCNC: 4.5 MMOL/L
PROT SERPL-MCNC: 6.4 G/DL
RBC # BLD: 5.07 M/UL
RBC # FLD: 13.8 %
SODIUM SERPL-SCNC: 141 MMOL/L
WBC # FLD AUTO: 11.21 K/UL

## 2021-03-23 ENCOUNTER — APPOINTMENT (OUTPATIENT)
Dept: CARDIOLOGY | Facility: CLINIC | Age: 56
End: 2021-03-23
Payer: COMMERCIAL

## 2021-03-23 VITALS
HEART RATE: 84 BPM | RESPIRATION RATE: 16 BRPM | SYSTOLIC BLOOD PRESSURE: 110 MMHG | OXYGEN SATURATION: 96 % | BODY MASS INDEX: 28.71 KG/M2 | DIASTOLIC BLOOD PRESSURE: 70 MMHG | WEIGHT: 156 LBS | HEIGHT: 62 IN

## 2021-03-23 PROCEDURE — 99214 OFFICE O/P EST MOD 30 MIN: CPT

## 2021-03-23 PROCEDURE — 99072 ADDL SUPL MATRL&STAF TM PHE: CPT

## 2021-03-29 NOTE — ASSESSMENT
[FreeTextEntry1] : 56 yo F with hx of ANURADHA/BSO with abdominoplasty, lipo, sacrocolpopexy on\par 11/24/20 who presents with SOB and chest pain, found to have RLL PE.\par Hemodynamically stable, bnp/trop negative, but remains symptomatic w/\par chest/back pain.\par \par 1. RLL PE - unclear if clearly provoked given she was 3-4 post op and has\par significant family history\par \par \par Plan:\par 1.  Reduce to eliquis 2.5mg BID  (unclear if truly provoked as more than 2 weeks passed since surgery, will be cautious in her case due to her mothers strong history of clotting)\par 2.  Repeat dimer in 1 month to assure that she is tolerating the half dose DOAC\par 3.  Return in 3 months for dimer check.  if all ok, then will transition to aspirin 81mg daily with DOAC prophylaxis for flights/long drives\par 4.  She will take eliquis 5mg prior to each flight in the meantime, stay hydrated, compression garments, ambulation\par \par \par 5.  Return 3 months.

## 2021-03-29 NOTE — HISTORY OF PRESENT ILLNESS
[FreeTextEntry1] : 3/23/2021\par \par Followp visit\par doing well with eliquis 5mg BID\par no bleeding issues\par she has recovered from surgery\par walking, running no issues\par returned aback to work. \par plans to travel later this month by airplane\par d-dimer recently was negative.

## 2021-04-04 ENCOUNTER — TRANSCRIPTION ENCOUNTER (OUTPATIENT)
Age: 56
End: 2021-04-04

## 2021-05-03 ENCOUNTER — RX RENEWAL (OUTPATIENT)
Age: 56
End: 2021-05-03

## 2021-05-17 DIAGNOSIS — Z86.711 PERSONAL HISTORY OF PULMONARY EMBOLISM: ICD-10-CM

## 2021-05-21 ENCOUNTER — NON-APPOINTMENT (OUTPATIENT)
Age: 56
End: 2021-05-21

## 2021-06-07 PROBLEM — Z12.39 BREAST CANCER SCREENING OTHER THAN MAMMOGRAM: Status: ACTIVE | Noted: 2021-06-07

## 2021-06-14 ENCOUNTER — APPOINTMENT (OUTPATIENT)
Dept: ULTRASOUND IMAGING | Facility: CLINIC | Age: 56
End: 2021-06-14
Payer: COMMERCIAL

## 2021-06-14 ENCOUNTER — OUTPATIENT (OUTPATIENT)
Dept: OUTPATIENT SERVICES | Facility: HOSPITAL | Age: 56
LOS: 1 days | End: 2021-06-14
Payer: COMMERCIAL

## 2021-06-14 ENCOUNTER — RESULT REVIEW (OUTPATIENT)
Age: 56
End: 2021-06-14

## 2021-06-14 ENCOUNTER — APPOINTMENT (OUTPATIENT)
Dept: MAMMOGRAPHY | Facility: CLINIC | Age: 56
End: 2021-06-14
Payer: COMMERCIAL

## 2021-06-14 DIAGNOSIS — Z00.8 ENCOUNTER FOR OTHER GENERAL EXAMINATION: ICD-10-CM

## 2021-06-14 DIAGNOSIS — Z98.890 OTHER SPECIFIED POSTPROCEDURAL STATES: Chronic | ICD-10-CM

## 2021-06-14 DIAGNOSIS — Z90.710 ACQUIRED ABSENCE OF BOTH CERVIX AND UTERUS: Chronic | ICD-10-CM

## 2021-06-14 DIAGNOSIS — Z90.49 ACQUIRED ABSENCE OF OTHER SPECIFIED PARTS OF DIGESTIVE TRACT: Chronic | ICD-10-CM

## 2021-06-14 DIAGNOSIS — Z12.39 ENCOUNTER FOR OTHER SCREENING FOR MALIGNANT NEOPLASM OF BREAST: ICD-10-CM

## 2021-06-14 PROCEDURE — 77063 BREAST TOMOSYNTHESIS BI: CPT | Mod: 26

## 2021-06-14 PROCEDURE — 77067 SCR MAMMO BI INCL CAD: CPT

## 2021-06-14 PROCEDURE — 77067 SCR MAMMO BI INCL CAD: CPT | Mod: 26

## 2021-06-14 PROCEDURE — 76641 ULTRASOUND BREAST COMPLETE: CPT

## 2021-06-14 PROCEDURE — 76641 ULTRASOUND BREAST COMPLETE: CPT | Mod: 26,50

## 2021-06-14 PROCEDURE — 77063 BREAST TOMOSYNTHESIS BI: CPT

## 2021-06-28 LAB
ALBUMIN SERPL ELPH-MCNC: 4.3 G/DL
ALP BLD-CCNC: 73 U/L
ALT SERPL-CCNC: 14 U/L
ANION GAP SERPL CALC-SCNC: 13 MMOL/L
AST SERPL-CCNC: 18 U/L
BASOPHILS # BLD AUTO: 0.06 K/UL
BASOPHILS NFR BLD AUTO: 0.5 %
BILIRUB SERPL-MCNC: 0.3 MG/DL
BUN SERPL-MCNC: 9 MG/DL
CALCIUM SERPL-MCNC: 9.8 MG/DL
CHLORIDE SERPL-SCNC: 103 MMOL/L
CO2 SERPL-SCNC: 25 MMOL/L
CREAT SERPL-MCNC: 0.77 MG/DL
DEPRECATED D DIMER PPP IA-ACNC: 153 NG/ML DDU
EOSINOPHIL # BLD AUTO: 0.22 K/UL
EOSINOPHIL NFR BLD AUTO: 1.9 %
GLUCOSE SERPL-MCNC: 94 MG/DL
HCT VFR BLD CALC: 42.4 %
HGB BLD-MCNC: 13.7 G/DL
IMM GRANULOCYTES NFR BLD AUTO: 0.2 %
LYMPHOCYTES # BLD AUTO: 3.73 K/UL
LYMPHOCYTES NFR BLD AUTO: 32.3 %
MAN DIFF?: NORMAL
MCHC RBC-ENTMCNC: 27.7 PG
MCHC RBC-ENTMCNC: 32.3 GM/DL
MCV RBC AUTO: 85.7 FL
MONOCYTES # BLD AUTO: 0.66 K/UL
MONOCYTES NFR BLD AUTO: 5.7 %
NEUTROPHILS # BLD AUTO: 6.86 K/UL
NEUTROPHILS NFR BLD AUTO: 59.4 %
NT-PROBNP SERPL-MCNC: 44 PG/ML
PLATELET # BLD AUTO: 288 K/UL
POTASSIUM SERPL-SCNC: 4.5 MMOL/L
PROT SERPL-MCNC: 7.3 G/DL
RBC # BLD: 4.95 M/UL
RBC # FLD: 13.5 %
SODIUM SERPL-SCNC: 141 MMOL/L
WBC # FLD AUTO: 11.55 K/UL

## 2021-06-29 ENCOUNTER — APPOINTMENT (OUTPATIENT)
Dept: CARDIOLOGY | Facility: CLINIC | Age: 56
End: 2021-06-29
Payer: COMMERCIAL

## 2021-06-29 PROCEDURE — 99214 OFFICE O/P EST MOD 30 MIN: CPT

## 2021-06-29 PROCEDURE — 99072 ADDL SUPL MATRL&STAF TM PHE: CPT

## 2021-06-29 NOTE — HISTORY OF PRESENT ILLNESS
[FreeTextEntry1] : 6/29/2021\par \par last seen in march\par On Apixban 2.5mg BID\par Labs ok yesterday.  Dimer is negative\par Feeling good\par She is fully recovered from surgery.\par no issues with eliquis.\par Going to dentist tomorrow, needs extraction\par last dose of eliquis was yesterday morning.\par \par 120/80\par 87\par 97% room air. \par \par 3/23/2021\par \par Followp visit\par doing well with eliquis 5mg BID\par no bleeding issues\par she has recovered from surgery\par walking, running no issues\par returned aback to work. \par plans to travel later this month by airplane\par d-dimer recently was negative.

## 2021-06-29 NOTE — PHYSICAL EXAM
[General Appearance - Well Developed] : well developed [Normal Appearance] : normal appearance [Well Groomed] : well groomed [General Appearance - Well Nourished] : well nourished [No Deformities] : no deformities [General Appearance - In No Acute Distress] : no acute distress [Normal Conjunctiva] : the conjunctiva exhibited no abnormalities [Eyelids - No Xanthelasma] : the eyelids demonstrated no xanthelasmas [Normal Oral Mucosa] : normal oral mucosa [No Oral Pallor] : no oral pallor [No Oral Cyanosis] : no oral cyanosis [Normal Jugular Venous A Waves Present] : normal jugular venous A waves present [Normal Jugular Venous V Waves Present] : normal jugular venous V waves present [No Jugular Venous Ko A Waves] : no jugular venous ko A waves [Respiration, Rhythm And Depth] : normal respiratory rhythm and effort [Exaggerated Use Of Accessory Muscles For Inspiration] : no accessory muscle use [Auscultation Breath Sounds / Voice Sounds] : lungs were clear to auscultation bilaterally [Heart Rate And Rhythm] : heart rate and rhythm were normal [Heart Sounds] : normal S1 and S2 [Murmurs] : no murmurs present [Abnormal Walk] : normal gait [Gait - Sufficient For Exercise Testing] : the gait was sufficient for exercise testing [Nail Clubbing] : no clubbing of the fingernails [Petechial Hemorrhages (___cm)] : no petechial hemorrhages [Cyanosis, Localized] : no localized cyanosis [] : no ischemic changes [FreeTextEntry1] : Soft, incision is healed.

## 2021-06-29 NOTE — ASSESSMENT
[FreeTextEntry1] : 54 yo F with hx of ANURADHA/BSO with abdominoplasty, lipo, sacrocolpopexy on\par 11/24/20 who presents with SOB and chest pain, found to have RLL PE.\par Hemodynamically stable, bnp/trop negative, but remains symptomatic w/\par chest/back pain.\par \par 1. RLL PE - unclear if clearly provoked given she was 3-4 post op and has\par significant family history\par \par \par Plan:\par 1. Will transition to aspirin 81mg daily with DOAC prophylaxis for flights/long drives.  Stop eliquis\par 2. Dimer in 1 month to assure stablity on Aspirin 81mg\par 3. Eliquis 2.5mg x 1 , 1 hour prior to flights or long car rides\par

## 2021-07-08 ENCOUNTER — APPOINTMENT (OUTPATIENT)
Dept: OBGYN | Facility: CLINIC | Age: 56
End: 2021-07-08

## 2021-08-03 ENCOUNTER — APPOINTMENT (OUTPATIENT)
Dept: GASTROENTEROLOGY | Facility: CLINIC | Age: 56
End: 2021-08-03
Payer: COMMERCIAL

## 2021-08-03 VITALS
BODY MASS INDEX: 27.97 KG/M2 | HEART RATE: 84 BPM | WEIGHT: 152 LBS | HEIGHT: 62 IN | OXYGEN SATURATION: 96 % | DIASTOLIC BLOOD PRESSURE: 72 MMHG | SYSTOLIC BLOOD PRESSURE: 110 MMHG | RESPIRATION RATE: 14 BRPM

## 2021-08-03 PROCEDURE — 82272 OCCULT BLD FECES 1-3 TESTS: CPT

## 2021-08-03 PROCEDURE — 99204 OFFICE O/P NEW MOD 45 MIN: CPT

## 2021-08-03 RX ORDER — OXYCODONE AND ACETAMINOPHEN 5; 325 MG/1; MG/1
5-325 TABLET ORAL
Qty: 20 | Refills: 0 | Status: DISCONTINUED | COMMUNITY
Start: 2020-11-23 | End: 2021-08-03

## 2021-08-03 RX ORDER — BISMUTH SUBSALICYLATE 525 MG/1
TABLET ORAL
Refills: 0 | Status: DISCONTINUED | COMMUNITY
End: 2021-08-03

## 2021-08-03 RX ORDER — ONDANSETRON 4 MG/1
4 TABLET ORAL
Qty: 10 | Refills: 0 | Status: DISCONTINUED | COMMUNITY
Start: 2020-11-26 | End: 2021-08-03

## 2021-08-03 RX ORDER — APIXABAN 2.5 MG/1
2.5 TABLET, FILM COATED ORAL
Qty: 60 | Refills: 5 | Status: DISCONTINUED | COMMUNITY
Start: 2021-03-16 | End: 2021-08-03

## 2021-08-03 NOTE — HISTORY OF PRESENT ILLNESS
[de-identified] : Patient states that 2 days ago she had an episode where she felt as though she had a blockage in her anus and had loose bowel movements.  She felt there was something there blocking it and she relieved it and some watery bowel movements came out and then she felt better.  Patient states she has had an issue with what she felt was regular bowel movements for years and had a colonoscopy 4 years ago which was unremarkable.  She has not no rectal bleeding and has no family history.  Last November she had a hysterectomy and since that time she has had lower abdominal discomfort in the area of the incision when she presses on it.  She moves her bowels every day but feels that the bowel movements are regular in consistency.  Prior to the episode this weekend she said she ate some food out at a street fair.  She was careful about what she ate because she says that she knows that certain foods especially fried in oil would bother her.  She does feel better today.  Recent blood work done by her primary care physician was reviewed and was negative.  Patient did have a pulmonary embolus in December after she had her surgery in November and initially was treated with Eliquis and now is just on aspirin.

## 2021-08-03 NOTE — PHYSICAL EXAM
[General Appearance - Alert] : alert [General Appearance - In No Acute Distress] : in no acute distress [] : no respiratory distress [Auscultation Breath Sounds / Voice Sounds] : lungs were clear to auscultation bilaterally [Heart Rate And Rhythm] : heart rate was normal and rhythm regular [Heart Sounds] : normal S1 and S2 [Heart Sounds Gallop] : no gallops [Murmurs] : no murmurs [Heart Sounds Pericardial Friction Rub] : no pericardial rub [Bowel Sounds] : normal bowel sounds [Abdomen Soft] : soft [FreeTextEntry1] : Lower abdominal tenderness to palpation with a Pfannenstiel incision [Normal Sphincter Tone] : normal sphincter tone [No Rectal Mass] : no rectal mass [Occult Blood Positive] : stool was negative for occult blood [Oriented To Time, Place, And Person] : oriented to person, place, and time [Impaired Insight] : insight and judgment were intact [Affect] : the affect was normal

## 2021-08-03 NOTE — ASSESSMENT
[FreeTextEntry1] : I discussed with the patient that I felt her symptoms were related to irritable bowel syndrome and her symptoms this past week and were probably related to a dietary indiscretion.  Rectal exam today was negative with no masses in the stool was never occult blood.  I felt would be appropriate for her to take Citrucel 1 tablespoon in 8 ounce of cold water daily and to call me in a couple weeks to let me know how she is doing.  I did tell her that she had some worsening symptoms similar to that she had this past weekend she could call me at any time.

## 2021-08-04 ENCOUNTER — NON-APPOINTMENT (OUTPATIENT)
Age: 56
End: 2021-08-04

## 2021-08-04 ENCOUNTER — EMERGENCY (EMERGENCY)
Facility: HOSPITAL | Age: 56
LOS: 1 days | Discharge: HOME | End: 2021-08-04
Attending: STUDENT IN AN ORGANIZED HEALTH CARE EDUCATION/TRAINING PROGRAM | Admitting: STUDENT IN AN ORGANIZED HEALTH CARE EDUCATION/TRAINING PROGRAM
Payer: COMMERCIAL

## 2021-08-04 VITALS
DIASTOLIC BLOOD PRESSURE: 71 MMHG | OXYGEN SATURATION: 99 % | HEART RATE: 70 BPM | SYSTOLIC BLOOD PRESSURE: 111 MMHG | TEMPERATURE: 98 F | RESPIRATION RATE: 16 BRPM

## 2021-08-04 VITALS
SYSTOLIC BLOOD PRESSURE: 130 MMHG | HEIGHT: 62.5 IN | RESPIRATION RATE: 16 BRPM | WEIGHT: 151.02 LBS | OXYGEN SATURATION: 99 % | DIASTOLIC BLOOD PRESSURE: 84 MMHG | TEMPERATURE: 98 F | HEART RATE: 70 BPM

## 2021-08-04 DIAGNOSIS — Z98.890 OTHER SPECIFIED POSTPROCEDURAL STATES: Chronic | ICD-10-CM

## 2021-08-04 DIAGNOSIS — Z90.710 ACQUIRED ABSENCE OF BOTH CERVIX AND UTERUS: Chronic | ICD-10-CM

## 2021-08-04 DIAGNOSIS — Z90.49 ACQUIRED ABSENCE OF OTHER SPECIFIED PARTS OF DIGESTIVE TRACT: Chronic | ICD-10-CM

## 2021-08-04 LAB
ALBUMIN SERPL ELPH-MCNC: 3.8 G/DL — SIGNIFICANT CHANGE UP (ref 3.3–5)
ALP SERPL-CCNC: 69 U/L — SIGNIFICANT CHANGE UP (ref 40–120)
ALT FLD-CCNC: 20 U/L — SIGNIFICANT CHANGE UP (ref 12–78)
ANION GAP SERPL CALC-SCNC: 5 MMOL/L — SIGNIFICANT CHANGE UP (ref 5–17)
APPEARANCE UR: CLEAR — SIGNIFICANT CHANGE UP
AST SERPL-CCNC: 12 U/L — LOW (ref 15–37)
BASOPHILS # BLD AUTO: 0.04 K/UL — SIGNIFICANT CHANGE UP (ref 0–0.2)
BASOPHILS NFR BLD AUTO: 0.3 % — SIGNIFICANT CHANGE UP (ref 0–2)
BILIRUB SERPL-MCNC: 0.5 MG/DL — SIGNIFICANT CHANGE UP (ref 0.2–1.2)
BILIRUB UR-MCNC: NEGATIVE — SIGNIFICANT CHANGE UP
BUN SERPL-MCNC: 10 MG/DL — SIGNIFICANT CHANGE UP (ref 7–23)
CALCIUM SERPL-MCNC: 9 MG/DL — SIGNIFICANT CHANGE UP (ref 8.5–10.1)
CHLORIDE SERPL-SCNC: 106 MMOL/L — SIGNIFICANT CHANGE UP (ref 96–108)
CO2 SERPL-SCNC: 29 MMOL/L — SIGNIFICANT CHANGE UP (ref 22–31)
COLOR SPEC: SIGNIFICANT CHANGE UP
CREAT SERPL-MCNC: 0.65 MG/DL — SIGNIFICANT CHANGE UP (ref 0.5–1.3)
DIFF PNL FLD: NEGATIVE — SIGNIFICANT CHANGE UP
EOSINOPHIL # BLD AUTO: 0.12 K/UL — SIGNIFICANT CHANGE UP (ref 0–0.5)
EOSINOPHIL NFR BLD AUTO: 0.8 % — SIGNIFICANT CHANGE UP (ref 0–6)
GLUCOSE SERPL-MCNC: 103 MG/DL — HIGH (ref 70–99)
GLUCOSE UR QL: NEGATIVE — SIGNIFICANT CHANGE UP
HCT VFR BLD CALC: 43.9 % — SIGNIFICANT CHANGE UP (ref 34.5–45)
HGB BLD-MCNC: 15 G/DL — SIGNIFICANT CHANGE UP (ref 11.5–15.5)
IMM GRANULOCYTES NFR BLD AUTO: 0.3 % — SIGNIFICANT CHANGE UP (ref 0–1.5)
KETONES UR-MCNC: NEGATIVE — SIGNIFICANT CHANGE UP
LEUKOCYTE ESTERASE UR-ACNC: NEGATIVE — SIGNIFICANT CHANGE UP
LIDOCAIN IGE QN: 74 U/L — SIGNIFICANT CHANGE UP (ref 73–393)
LYMPHOCYTES # BLD AUTO: 20.5 % — SIGNIFICANT CHANGE UP (ref 13–44)
LYMPHOCYTES # BLD AUTO: 3 K/UL — SIGNIFICANT CHANGE UP (ref 1–3.3)
MCHC RBC-ENTMCNC: 28.1 PG — SIGNIFICANT CHANGE UP (ref 27–34)
MCHC RBC-ENTMCNC: 34.2 GM/DL — SIGNIFICANT CHANGE UP (ref 32–36)
MCV RBC AUTO: 82.4 FL — SIGNIFICANT CHANGE UP (ref 80–100)
MONOCYTES # BLD AUTO: 0.56 K/UL — SIGNIFICANT CHANGE UP (ref 0–0.9)
MONOCYTES NFR BLD AUTO: 3.8 % — SIGNIFICANT CHANGE UP (ref 2–14)
NEUTROPHILS # BLD AUTO: 10.87 K/UL — HIGH (ref 1.8–7.4)
NEUTROPHILS NFR BLD AUTO: 74.3 % — SIGNIFICANT CHANGE UP (ref 43–77)
NITRITE UR-MCNC: NEGATIVE — SIGNIFICANT CHANGE UP
NRBC # BLD: 0 /100 WBCS — SIGNIFICANT CHANGE UP (ref 0–0)
PH UR: 7 — SIGNIFICANT CHANGE UP (ref 5–8)
PLATELET # BLD AUTO: 306 K/UL — SIGNIFICANT CHANGE UP (ref 150–400)
POTASSIUM SERPL-MCNC: 4 MMOL/L — SIGNIFICANT CHANGE UP (ref 3.5–5.3)
POTASSIUM SERPL-SCNC: 4 MMOL/L — SIGNIFICANT CHANGE UP (ref 3.5–5.3)
PROT SERPL-MCNC: 7.9 G/DL — SIGNIFICANT CHANGE UP (ref 6–8.3)
PROT UR-MCNC: NEGATIVE — SIGNIFICANT CHANGE UP
RBC # BLD: 5.33 M/UL — HIGH (ref 3.8–5.2)
RBC # FLD: 13.2 % — SIGNIFICANT CHANGE UP (ref 10.3–14.5)
SODIUM SERPL-SCNC: 140 MMOL/L — SIGNIFICANT CHANGE UP (ref 135–145)
SP GR SPEC: 1 — LOW (ref 1.01–1.02)
UROBILINOGEN FLD QL: NEGATIVE — SIGNIFICANT CHANGE UP
WBC # BLD: 14.64 K/UL — HIGH (ref 3.8–10.5)
WBC # FLD AUTO: 14.64 K/UL — HIGH (ref 3.8–10.5)

## 2021-08-04 PROCEDURE — 74177 CT ABD & PELVIS W/CONTRAST: CPT | Mod: 26,ME

## 2021-08-04 PROCEDURE — 83690 ASSAY OF LIPASE: CPT

## 2021-08-04 PROCEDURE — 80053 COMPREHEN METABOLIC PANEL: CPT

## 2021-08-04 PROCEDURE — 85025 COMPLETE CBC W/AUTO DIFF WBC: CPT

## 2021-08-04 PROCEDURE — G1004: CPT

## 2021-08-04 PROCEDURE — 81003 URINALYSIS AUTO W/O SCOPE: CPT

## 2021-08-04 PROCEDURE — 74177 CT ABD & PELVIS W/CONTRAST: CPT | Mod: ME

## 2021-08-04 PROCEDURE — 36415 COLL VENOUS BLD VENIPUNCTURE: CPT

## 2021-08-04 PROCEDURE — 99284 EMERGENCY DEPT VISIT MOD MDM: CPT

## 2021-08-04 PROCEDURE — 99284 EMERGENCY DEPT VISIT MOD MDM: CPT | Mod: 25

## 2021-08-04 PROCEDURE — 96374 THER/PROPH/DIAG INJ IV PUSH: CPT | Mod: XU

## 2021-08-04 RX ORDER — ONDANSETRON 8 MG/1
4 TABLET, FILM COATED ORAL ONCE
Refills: 0 | Status: COMPLETED | OUTPATIENT
Start: 2021-08-04 | End: 2021-08-04

## 2021-08-04 RX ORDER — MORPHINE SULFATE 50 MG/1
4 CAPSULE, EXTENDED RELEASE ORAL ONCE
Refills: 0 | Status: DISCONTINUED | OUTPATIENT
Start: 2021-08-04 | End: 2021-08-04

## 2021-08-04 RX ORDER — SODIUM CHLORIDE 9 MG/ML
1000 INJECTION INTRAMUSCULAR; INTRAVENOUS; SUBCUTANEOUS ONCE
Refills: 0 | Status: COMPLETED | OUTPATIENT
Start: 2021-08-04 | End: 2021-08-04

## 2021-08-04 RX ORDER — METOCLOPRAMIDE HCL 10 MG
1 TABLET ORAL
Qty: 40 | Refills: 0
Start: 2021-08-04 | End: 2021-08-13

## 2021-08-04 RX ADMIN — SODIUM CHLORIDE 1000 MILLILITER(S): 9 INJECTION INTRAMUSCULAR; INTRAVENOUS; SUBCUTANEOUS at 16:53

## 2021-08-04 RX ADMIN — ONDANSETRON 4 MILLIGRAM(S): 8 TABLET, FILM COATED ORAL at 16:53

## 2021-08-04 NOTE — ED PROVIDER NOTE - OBJECTIVE STATEMENT
57 yo F PMHx IBS, uterine prolapse presents to ED c/o intermittent diffuse abd pain, N/V  x 4 days. Pt states pain getting worse, localizing to LLQ. Pt states GI sent her in to ro divertic. Pt denies fever/chills, chest pain, SOB, recent travel, sick contacts.

## 2021-08-04 NOTE — ED PROVIDER NOTE - PATIENT PORTAL LINK FT
You can access the FollowMyHealth Patient Portal offered by [facility] by registering at the following website: [email or web address]. By joining [facility]’s FollowMyHealth portal, you will also be able to view your health information using other applications (apps) compatible with our system.

## 2021-08-04 NOTE — ED PROVIDER NOTE - ATTENDING CONTRIBUTION TO CARE
56 F with history of IBS here with nausea, vomiting, LLQ pain. pain is better with BM. Feels different then her IBS. On exam, patient comfortable, mild LLQ tenderness to palpation. check labs, CT, r/o colitis, diverticulitis, UTI, pyelonephritis.

## 2021-08-04 NOTE — ED PROVIDER NOTE - NSFOLLOWUPINSTRUCTIONS_ED_ALL_ED_FT
Please follow up with your GI doctor and PMD.    Acute Abdominal Pain    WHAT YOU NEED TO KNOW:    The cause of your abdominal pain may not be found. If a cause is found, treatment will depend on what the cause is.     DISCHARGE INSTRUCTIONS:    Return to the emergency department if:     You vomit blood or cannot stop vomiting.      You have blood in your bowel movement or it looks like tar.       You have bleeding from your rectum.       Your abdomen is larger than usual, more painful, and hard.       You have severe pain in your abdomen.       You stop passing gas and having bowel movements.       You feel weak, dizzy, or faint.    Contact your healthcare provider if:     You have a fever.      You have new signs and symptoms.      Your symptoms do not get better with treatment.       You have questions or concerns about your condition or care.    Medicines may be given to decrease pain, treat an infection, and manage your symptoms. Take your medicine as directed. Call your healthcare provider if you think your medicine is not helping or if you have side effects. Tell him if you are allergic to any medicine. Keep a list of the medicines, vitamins, and herbs you take. Include the amounts, and when and why you take them. Bring the list or the pill bottles to follow-up visits. Carry your medicine list with you in case of an emergency.    Manage your symptoms:     Apply heat on your abdomen for 20 to 30 minutes every 2 hours for as many days as directed. Heat helps decrease pain and muscle spasms.       Manage your stress. Stress may cause abdominal pain. Your healthcare provider may recommend relaxation techniques and deep breathing exercises to help decrease your stress. Your healthcare provider may recommend you talk to someone about your stress or anxiety, such as a counselor or a trusted friend. Get plenty of sleep and exercise regularly.       Limit or do not drink alcohol. Alcohol can make your abdominal pain worse. Ask your healthcare provider if it is safe for you to drink alcohol. Also ask how much is safe for you to drink.       Do not smoke. Nicotine and other chemicals in cigarettes can damage your esophagus and stomach. Ask your healthcare provider for information if you currently smoke and need help to quit. E-cigarettes or smokeless tobacco still contain nicotine. Talk to your healthcare provider before you use these products.     Make changes to the food you eat as directed: Do not eat foods that cause abdominal pain or other symptoms. Eat small meals more often.     Eat more high-fiber foods if you are constipated. High-fiber foods include fruits, vegetables, whole-grain foods, and legumes.       Do not eat foods that cause gas if you have bloating. Examples include broccoli, cabbage, and cauliflower. Do not drink soda or carbonated drinks, because these may also cause gas.       Do not eat foods or drinks that contain sorbitol or fructose if you have diarrhea and bloating. Some examples are fruit juices, candy, jelly, and sugar-free gum.       Do not eat high-fat foods, such as fried foods, cheeseburgers, hot dogs, and desserts.      Limit or do not drink caffeine. Caffeine may make symptoms, such as heart burn or nausea, worse.       Drink plenty of liquids to prevent dehydration from diarrhea or vomiting. Ask your healthcare provider how much liquid to drink each day and which liquids are best for you.

## 2021-08-04 NOTE — ED PROVIDER NOTE - PROGRESS NOTE DETAILS
patient feels well, pain resolved, no vomiting. patient has GI doc to follow up with. will discharge.

## 2021-09-05 ENCOUNTER — EMERGENCY (EMERGENCY)
Facility: HOSPITAL | Age: 56
LOS: 1 days | Discharge: HOME | End: 2021-09-05
Attending: STUDENT IN AN ORGANIZED HEALTH CARE EDUCATION/TRAINING PROGRAM | Admitting: STUDENT IN AN ORGANIZED HEALTH CARE EDUCATION/TRAINING PROGRAM
Payer: COMMERCIAL

## 2021-09-05 VITALS
HEIGHT: 62.5 IN | DIASTOLIC BLOOD PRESSURE: 75 MMHG | SYSTOLIC BLOOD PRESSURE: 114 MMHG | HEART RATE: 65 BPM | WEIGHT: 149.91 LBS | TEMPERATURE: 98 F | RESPIRATION RATE: 18 BRPM | OXYGEN SATURATION: 100 %

## 2021-09-05 DIAGNOSIS — Z98.890 OTHER SPECIFIED POSTPROCEDURAL STATES: Chronic | ICD-10-CM

## 2021-09-05 DIAGNOSIS — Z90.49 ACQUIRED ABSENCE OF OTHER SPECIFIED PARTS OF DIGESTIVE TRACT: Chronic | ICD-10-CM

## 2021-09-05 DIAGNOSIS — Z90.710 ACQUIRED ABSENCE OF BOTH CERVIX AND UTERUS: Chronic | ICD-10-CM

## 2021-09-05 LAB
ALBUMIN SERPL ELPH-MCNC: 3.9 G/DL — SIGNIFICANT CHANGE UP (ref 3.3–5)
ALP SERPL-CCNC: 64 U/L — SIGNIFICANT CHANGE UP (ref 40–120)
ALT FLD-CCNC: 21 U/L — SIGNIFICANT CHANGE UP (ref 12–78)
ANION GAP SERPL CALC-SCNC: 7 MMOL/L — SIGNIFICANT CHANGE UP (ref 5–17)
AST SERPL-CCNC: 19 U/L — SIGNIFICANT CHANGE UP (ref 15–37)
BASOPHILS # BLD AUTO: 0.04 K/UL — SIGNIFICANT CHANGE UP (ref 0–0.2)
BASOPHILS NFR BLD AUTO: 0.4 % — SIGNIFICANT CHANGE UP (ref 0–2)
BILIRUB SERPL-MCNC: 0.5 MG/DL — SIGNIFICANT CHANGE UP (ref 0.2–1.2)
BUN SERPL-MCNC: 9 MG/DL — SIGNIFICANT CHANGE UP (ref 7–23)
CALCIUM SERPL-MCNC: 8.9 MG/DL — SIGNIFICANT CHANGE UP (ref 8.5–10.1)
CHLORIDE SERPL-SCNC: 108 MMOL/L — SIGNIFICANT CHANGE UP (ref 96–108)
CO2 SERPL-SCNC: 27 MMOL/L — SIGNIFICANT CHANGE UP (ref 22–31)
CREAT SERPL-MCNC: 0.8 MG/DL — SIGNIFICANT CHANGE UP (ref 0.5–1.3)
EOSINOPHIL # BLD AUTO: 0.25 K/UL — SIGNIFICANT CHANGE UP (ref 0–0.5)
EOSINOPHIL NFR BLD AUTO: 2.6 % — SIGNIFICANT CHANGE UP (ref 0–6)
GLUCOSE SERPL-MCNC: 97 MG/DL — SIGNIFICANT CHANGE UP (ref 70–99)
HCT VFR BLD CALC: 42.6 % — SIGNIFICANT CHANGE UP (ref 34.5–45)
HGB BLD-MCNC: 14.4 G/DL — SIGNIFICANT CHANGE UP (ref 11.5–15.5)
IMM GRANULOCYTES NFR BLD AUTO: 0.3 % — SIGNIFICANT CHANGE UP (ref 0–1.5)
LYMPHOCYTES # BLD AUTO: 3.64 K/UL — HIGH (ref 1–3.3)
LYMPHOCYTES # BLD AUTO: 37.5 % — SIGNIFICANT CHANGE UP (ref 13–44)
MCHC RBC-ENTMCNC: 28.1 PG — SIGNIFICANT CHANGE UP (ref 27–34)
MCHC RBC-ENTMCNC: 33.8 GM/DL — SIGNIFICANT CHANGE UP (ref 32–36)
MCV RBC AUTO: 83.2 FL — SIGNIFICANT CHANGE UP (ref 80–100)
MONOCYTES # BLD AUTO: 0.52 K/UL — SIGNIFICANT CHANGE UP (ref 0–0.9)
MONOCYTES NFR BLD AUTO: 5.4 % — SIGNIFICANT CHANGE UP (ref 2–14)
NEUTROPHILS # BLD AUTO: 5.22 K/UL — SIGNIFICANT CHANGE UP (ref 1.8–7.4)
NEUTROPHILS NFR BLD AUTO: 53.8 % — SIGNIFICANT CHANGE UP (ref 43–77)
NRBC # BLD: 0 /100 WBCS — SIGNIFICANT CHANGE UP (ref 0–0)
PLATELET # BLD AUTO: 268 K/UL — SIGNIFICANT CHANGE UP (ref 150–400)
POTASSIUM SERPL-MCNC: 3.8 MMOL/L — SIGNIFICANT CHANGE UP (ref 3.5–5.3)
POTASSIUM SERPL-SCNC: 3.8 MMOL/L — SIGNIFICANT CHANGE UP (ref 3.5–5.3)
PROT SERPL-MCNC: 7.8 G/DL — SIGNIFICANT CHANGE UP (ref 6–8.3)
RBC # BLD: 5.12 M/UL — SIGNIFICANT CHANGE UP (ref 3.8–5.2)
RBC # FLD: 13.5 % — SIGNIFICANT CHANGE UP (ref 10.3–14.5)
SODIUM SERPL-SCNC: 142 MMOL/L — SIGNIFICANT CHANGE UP (ref 135–145)
WBC # BLD: 9.7 K/UL — SIGNIFICANT CHANGE UP (ref 3.8–10.5)
WBC # FLD AUTO: 9.7 K/UL — SIGNIFICANT CHANGE UP (ref 3.8–10.5)

## 2021-09-05 PROCEDURE — 99283 EMERGENCY DEPT VISIT LOW MDM: CPT | Mod: 25

## 2021-09-05 PROCEDURE — 36415 COLL VENOUS BLD VENIPUNCTURE: CPT

## 2021-09-05 PROCEDURE — 71046 X-RAY EXAM CHEST 2 VIEWS: CPT | Mod: 26

## 2021-09-05 PROCEDURE — 71046 X-RAY EXAM CHEST 2 VIEWS: CPT

## 2021-09-05 PROCEDURE — 93005 ELECTROCARDIOGRAM TRACING: CPT

## 2021-09-05 PROCEDURE — 93010 ELECTROCARDIOGRAM REPORT: CPT

## 2021-09-05 PROCEDURE — 85025 COMPLETE CBC W/AUTO DIFF WBC: CPT

## 2021-09-05 PROCEDURE — 80053 COMPREHEN METABOLIC PANEL: CPT

## 2021-09-05 PROCEDURE — 99284 EMERGENCY DEPT VISIT MOD MDM: CPT

## 2021-09-05 RX ORDER — SODIUM CHLORIDE 9 MG/ML
1000 INJECTION INTRAMUSCULAR; INTRAVENOUS; SUBCUTANEOUS ONCE
Refills: 0 | Status: COMPLETED | OUTPATIENT
Start: 2021-09-05 | End: 2021-09-05

## 2021-09-05 RX ADMIN — SODIUM CHLORIDE 1000 MILLILITER(S): 9 INJECTION INTRAMUSCULAR; INTRAVENOUS; SUBCUTANEOUS at 11:10

## 2021-09-05 NOTE — ED PROVIDER NOTE - PHYSICAL EXAMINATION
Gen: Well appearing in NAD  Head: NC/AT  Neck: trachea midline  cv: rrr  Resp:  No distress, lungs clear  abd nontender   Ext: no deformities  Neuro:  A&O appears non focal  Skin:  Warm and dry as visualized  Psych:  Normal affect and mood

## 2021-09-05 NOTE — ED PROVIDER NOTE - NSFOLLOWUPINSTRUCTIONS_ED_ALL_ED_FT
1. TAKE ALL MEDICATIONS AS DIRECTED.    2. FOR PAIN OR FEVER YOU CAN TAKE IBUPROFEN (MOTRIN, ADVIL) OR ACETAMINOPHEN (TYLENOL) AS NEEDED, AS DIRECTED ON PACKAGING.  3. FOLLOW UP WITH YOUR PRIMARY DOCTOR WITHIN 5 DAYS AS DIRECTED.  4. IF YOU HAD LABS OR IMAGING DONE, YOU WERE GIVEN COPIES OF ALL LABS AND/OR IMAGING RESULTS FROM YOUR ER VISIT--PLEASE TAKE THEM WITH YOU TO YOUR FOLLOW UP APPOINTMENTS.  5. IF NEEDED, CALL PATIENT ACCESS SERVICES AT [phone] TO FIND A PRIMARY CARE PHYSICIAN.  OR CALL [phone] TO MAKE AN APPOINTMENT WITH THE CLINIC.  6. RETURN TO THE ER FOR ANY WORSENING SYMPTOMS OR CONCERNS.

## 2021-09-05 NOTE — ED PROVIDER NOTE - OBJECTIVE STATEMENT
57yo F ho IBS on dicyclomine pw elavted Cr and K. pt is currently being evaluated by GI for abd pain, was seen in ED 1 month ago and had normal labrowk and CT. had outpt labwork done yesterday and received call today that cr 2.66 and K 6.2 were elevated, pt denies chest pain, sob, current abd pain, nausea, vomiting, diarrhea, + urinating normally, normal PO intake    pcp dipika

## 2021-09-05 NOTE — ED PROVIDER NOTE - PATIENT PORTAL LINK FT
You can access the FollowMyHealth Patient Portal offered by [facility] by registering at the following website: [email or web address]. By joining [facility]’s FollowMyHealth portal, you will also be able to view your health information using other applications (apps) compatible with our system.

## 2021-09-05 NOTE — ED ADULT NURSE NOTE - OBJECTIVE STATEMENT
Pt received sitting on stretcher in NAD. Pt AOx3 C/o states MD called her that her labs were abnormal and to come into ED to have blood work repeated. Neuro WNL. PERRLA. Lungs CTA, RR even unlabored. Ab soft non tender, + bowel sounds x 4quads. Denies Nausea, Vomiting, Diarrhea. Skin warm, dry, color appropriate for age and race.

## 2021-09-07 LAB — DEPRECATED D DIMER PPP IA-ACNC: 180 NG/ML DDU

## 2021-11-02 ENCOUNTER — APPOINTMENT (OUTPATIENT)
Dept: GASTROENTEROLOGY | Facility: CLINIC | Age: 56
End: 2021-11-02
Payer: COMMERCIAL

## 2021-11-02 VITALS
DIASTOLIC BLOOD PRESSURE: 64 MMHG | HEART RATE: 91 BPM | SYSTOLIC BLOOD PRESSURE: 108 MMHG | RESPIRATION RATE: 14 BRPM | HEIGHT: 62 IN | OXYGEN SATURATION: 99 % | WEIGHT: 162 LBS | BODY MASS INDEX: 29.81 KG/M2 | TEMPERATURE: 97.4 F

## 2021-11-02 PROCEDURE — 99214 OFFICE O/P EST MOD 30 MIN: CPT

## 2021-11-02 NOTE — ASSESSMENT
[FreeTextEntry1] : I discussed with the patient I thought her symptoms were predominantly due to constipation.  Toward that end I felt she should be treated empirically with MiraLAX 17 g every night to see if this helps ameliorate her symptoms.  I asked her to call me in a few weeks to let me know how she is doing.  If she is responding we will continue that therapy but if she has persistent symptoms we need to do further investigation such as another colonoscopy and possibly MRI defecography.

## 2021-11-02 NOTE — HISTORY OF PRESENT ILLNESS
[de-identified] : Patient was seen a couple months ago and was treated with Citrucel.  She was still having symptoms and some discomfort and dicyclomine was added.  Despite that after taking both medications for more than a month she really only saw minimal improvement.  Her main complaint is constipation which feeling of having to move her bowels but difficulty for anything to come out or small pieces when she does go.  She has no bleeding or significant abdominal pain.

## 2021-11-12 ENCOUNTER — TRANSCRIPTION ENCOUNTER (OUTPATIENT)
Age: 56
End: 2021-11-12

## 2021-11-20 ENCOUNTER — TRANSCRIPTION ENCOUNTER (OUTPATIENT)
Age: 56
End: 2021-11-20

## 2021-12-09 ENCOUNTER — TRANSCRIPTION ENCOUNTER (OUTPATIENT)
Age: 56
End: 2021-12-09

## 2021-12-10 ENCOUNTER — OUTPATIENT (OUTPATIENT)
Dept: OUTPATIENT SERVICES | Facility: HOSPITAL | Age: 56
LOS: 1 days | End: 2021-12-10
Payer: COMMERCIAL

## 2021-12-10 ENCOUNTER — APPOINTMENT (OUTPATIENT)
Dept: DISASTER EMERGENCY | Facility: HOSPITAL | Age: 56
End: 2021-12-10

## 2021-12-10 ENCOUNTER — TRANSCRIPTION ENCOUNTER (OUTPATIENT)
Age: 56
End: 2021-12-10

## 2021-12-10 VITALS
RESPIRATION RATE: 18 BRPM | SYSTOLIC BLOOD PRESSURE: 115 MMHG | HEART RATE: 86 BPM | DIASTOLIC BLOOD PRESSURE: 80 MMHG | TEMPERATURE: 99 F | OXYGEN SATURATION: 100 %

## 2021-12-10 VITALS
OXYGEN SATURATION: 96 % | TEMPERATURE: 99 F | WEIGHT: 154.98 LBS | SYSTOLIC BLOOD PRESSURE: 123 MMHG | RESPIRATION RATE: 18 BRPM | HEART RATE: 95 BPM | DIASTOLIC BLOOD PRESSURE: 84 MMHG | HEIGHT: 63 IN

## 2021-12-10 DIAGNOSIS — Z90.49 ACQUIRED ABSENCE OF OTHER SPECIFIED PARTS OF DIGESTIVE TRACT: Chronic | ICD-10-CM

## 2021-12-10 DIAGNOSIS — U07.1 COVID-19: ICD-10-CM

## 2021-12-10 DIAGNOSIS — Z98.890 OTHER SPECIFIED POSTPROCEDURAL STATES: Chronic | ICD-10-CM

## 2021-12-10 DIAGNOSIS — Z90.710 ACQUIRED ABSENCE OF BOTH CERVIX AND UTERUS: Chronic | ICD-10-CM

## 2021-12-10 PROCEDURE — M0243: CPT

## 2021-12-10 RX ORDER — SODIUM CHLORIDE 9 MG/ML
250 INJECTION INTRAMUSCULAR; INTRAVENOUS; SUBCUTANEOUS
Refills: 0 | Status: COMPLETED | OUTPATIENT
Start: 2021-12-10 | End: 2021-12-10

## 2021-12-10 RX ADMIN — SODIUM CHLORIDE 310 MILLILITER(S): 9 INJECTION INTRAMUSCULAR; INTRAVENOUS; SUBCUTANEOUS at 16:09

## 2021-12-10 NOTE — CHART NOTE - NSCHARTNOTEFT_GEN_A_CORE
CC: Monoclonal Antibody Infusion/COVID 19 Positive  56y Female with PE on Eliquis and recent dx of COVID 19+ who presents today for elective Casirivimab/Imdevimab  infusion. Patient has been screened and was deemed to be a candidate.    Symptoms/ Criteria:   Date of Symptom Onset: 12/9/21  Symptoms: cough, fevers, sore throat, congestion  Date of Positive COVID PCR: 12/9/21  Risk Profile includes: overweight    Vaccination Status: Yes      Exam/findings:  T(C): --  HR: --  BP: --  RR: --  SpO2: --      PE:   Appearance: NAD	  HEENT:  NC/AT, anicteric  Cardiovascular: Normal S1 S2, No JVD, No murmurs, No edema  Respiratory: Lungs clear to auscultation	  Gastrointestinal:  Soft, Non-tender, + BS	  Skin: warm and dry, no rash or urticaria   Neurologic: Non-focal  Extremities: Normal range of motion,    ASSESSMENT:  Pt is COVID positive and symptomatic who was referred to the infusion center for elective Monoclonal antibody infusion (Casirivimab/Imdevimab - [facility]).  PLAN:  - Infusion procedure explained to patient.  1. FDA has authorized emergency use Casirivimab/Imdevimab, which is not an FDA-approved biological product.  2. The patient or patient's caregiver has the option to accept or refuse administration of Casirivimab/Imdevimab.   3. The significant known and potential risks and benefits of Casirivimab/Imdevimab and the extent to which such risks and benefits are unknown.  4. Information on available alternative treatments and risks and benefits of those alternatives.  5.  Patient verbalized understanding of plan and agrees to treatment.  6.  All questions answered.  - Consent for monoclonal antibody infusion obtained.   - Infuse Casirivimab/Imdevimab (600mg/600mg) IV over 21 minutes.  - Observe patient for one hour post infusion, and then if stable discharge home with oupt follow up as planned by PMD.      POST INFUSION ASSESSMENT:   Patient completed infusion, and monitored x 1 hour post-infusion with no adverse reactions noted, remained hemodynamically stable.    T(C): --  HR: --  BP: --  RR: --  SpO2: --        DISCHARGE at __________      - Patient tolerated infusion well; denies complaints of chest pain/SOB/dizziness/palpitations.   - VSS for discharge home.  - D/C instructions given/ fact sheet included.  - Patient advised to follow-up with PCP. CC: Monoclonal Antibody Infusion/COVID 19 Positive  56y Female with PE on Eliquis and recent dx of COVID 19+ who presents today for elective Casirivimab/Imdevimab  infusion. Patient has been screened and was deemed to be a candidate.    Symptoms/ Criteria:   Date of Symptom Onset: 12/9/21  Symptoms: cough, fevers, sore throat, congestion  Date of Positive COVID PCR: 12/9/21  Risk Profile includes: overweight    Vaccination Status: Yes      Exam/findings:  Vital Signs Last 24 Hrs  T(C): 37.3 (10 Dec 2021 15:48), Max: 37.3 (10 Dec 2021 15:48)  T(F): 99.2 (10 Dec 2021 15:48), Max: 99.2 (10 Dec 2021 15:48)  HR: 95 (10 Dec 2021 15:48) (95 - 95)  BP: 123/84 (10 Dec 2021 15:48) (123/84 - 123/84)  BP(mean): --  RR: 18 (10 Dec 2021 15:48) (18 - 18)  SpO2: 96% (10 Dec 2021 15:48) (96% - 96%)    PE:   Appearance: NAD	  HEENT:  NC/AT, anicteric  Cardiovascular: Normal S1 S2, No JVD, No murmurs, No edema  Respiratory: Lungs clear to auscultation	  Gastrointestinal:  Soft, Non-tender, + BS	  Skin: warm and dry, no rash or urticaria   Neurologic: Non-focal  Extremities: Normal range of motion,    ASSESSMENT:  Pt is COVID positive and symptomatic who was referred to the infusion center for elective Monoclonal antibody infusion (Casirivimab/Imdevimab - [facility]).  PLAN:  - Infusion procedure explained to patient.  1. FDA has authorized emergency use Casirivimab/Imdevimab, which is not an FDA-approved biological product.  2. The patient or patient's caregiver has the option to accept or refuse administration of Casirivimab/Imdevimab.   3. The significant known and potential risks and benefits of Casirivimab/Imdevimab and the extent to which such risks and benefits are unknown.  4. Information on available alternative treatments and risks and benefits of those alternatives.  5.  Patient verbalized understanding of plan and agrees to treatment.  6.  All questions answered.  - Consent for monoclonal antibody infusion obtained.   - Infuse Casirivimab/Imdevimab (600mg/600mg) IV over 21 minutes.  - Observe patient for one hour post infusion, and then if stable discharge home with oupt follow up as planned by PMD.      POST INFUSION ASSESSMENT:   Patient completed infusion, and monitored x 1 hour post-infusion with no adverse reactions noted, remained hemodynamically stable.    T(C): --  HR: --  BP: --  RR: --  SpO2: --        DISCHARGE at __________      - Patient tolerated infusion well; denies complaints of chest pain/SOB/dizziness/palpitations.   - VSS for discharge home.  - D/C instructions given/ fact sheet included.  - Patient advised to follow-up with PCP. CC: Monoclonal Antibody Infusion/COVID 19 Positive  56y Female with PE on Eliquis and recent dx of COVID 19+ who presents today for elective Casirivimab/Imdevimab  infusion. Patient has been screened and was deemed to be a candidate.    Symptoms/ Criteria:   Date of Symptom Onset: 12/9/21  Symptoms: cough, fevers, sore throat, congestion  Date of Positive COVID PCR: 12/9/21  Risk Profile includes: overweight    Vaccination Status: Yes      Exam/findings:  Vital Signs Last 24 Hrs  T(C): 37.3 (10 Dec 2021 15:48), Max: 37.3 (10 Dec 2021 15:48)  T(F): 99.2 (10 Dec 2021 15:48), Max: 99.2 (10 Dec 2021 15:48)  HR: 95 (10 Dec 2021 15:48) (95 - 95)  BP: 123/84 (10 Dec 2021 15:48) (123/84 - 123/84)  BP(mean): --  RR: 18 (10 Dec 2021 15:48) (18 - 18)  SpO2: 96% (10 Dec 2021 15:48) (96% - 96%)    PE:   Appearance: NAD	  HEENT:  NC/AT, anicteric  Cardiovascular: Normal S1 S2, No JVD, No murmurs, No edema  Respiratory: Lungs clear to auscultation	  Gastrointestinal:  Soft, Non-tender, + BS	  Skin: warm and dry, no rash or urticaria   Neurologic: Non-focal  Extremities: Normal range of motion,    ASSESSMENT:  Pt is COVID positive and symptomatic who was referred to the infusion center for elective Monoclonal antibody infusion (Casirivimab/Imdevimab - [facility]).  PLAN:  - Infusion procedure explained to patient.  1. FDA has authorized emergency use Casirivimab/Imdevimab, which is not an FDA-approved biological product.  2. The patient or patient's caregiver has the option to accept or refuse administration of Casirivimab/Imdevimab.   3. The significant known and potential risks and benefits of Casirivimab/Imdevimab and the extent to which such risks and benefits are unknown.  4. Information on available alternative treatments and risks and benefits of those alternatives.  5.  Patient verbalized understanding of plan and agrees to treatment.  6.  All questions answered.  - Consent for monoclonal antibody infusion obtained.   - Infuse Casirivimab/Imdevimab (600mg/600mg) IV over 21 minutes.  - Observe patient for one hour post infusion, and then if stable discharge home with oupt follow up as planned by PMD.      POST INFUSION ASSESSMENT:   Patient completed infusion, and monitored x 1 hour post-infusion with no adverse reactions noted, remained hemodynamically stable.    Vital Signs Last 24 Hrs  T(C): 37.2 (10 Dec 2021 16:27), Max: 37.3 (10 Dec 2021 15:48)  T(F): 98.9 (10 Dec 2021 16:27), Max: 99.2 (10 Dec 2021 15:48)  HR: 84 (10 Dec 2021 16:27) (83 - 95)  BP: 111/78 (10 Dec 2021 16:27) (111/78 - 123/84)  BP(mean): --  RR: 18 (10 Dec 2021 16:27) (16 - 18)  SpO2: 99% (10 Dec 2021 16:27) (96% - 99%)    DISCHARGE at 1730.      - Patient tolerated infusion well; denies complaints of chest pain/SOB/dizziness/palpitations.   - VSS for discharge home.  - D/C instructions given/ fact sheet included.  - Patient advised to follow-up with PCP. CC: Monoclonal Antibody Infusion/COVID 19 Positive  56y Female with PE on Eliquis and recent dx of COVID 19+ who presents today for elective Casirivimab/Imdevimab  infusion. Patient has been screened and was deemed to be a candidate.    Symptoms/ Criteria:   Date of Symptom Onset: 12/9/21  Symptoms: cough, fevers, sore throat, congestion  Date of Positive COVID PCR: 12/9/21  Risk Profile includes: overweight    Vaccination Status: Yes      Exam/findings:  Vital Signs Last 24 Hrs  T(C): 37.3 (10 Dec 2021 15:48), Max: 37.3 (10 Dec 2021 15:48)  T(F): 99.2 (10 Dec 2021 15:48), Max: 99.2 (10 Dec 2021 15:48)  HR: 95 (10 Dec 2021 15:48) (95 - 95)  BP: 123/84 (10 Dec 2021 15:48) (123/84 - 123/84)  BP(mean): --  RR: 18 (10 Dec 2021 15:48) (18 - 18)  SpO2: 96% (10 Dec 2021 15:48) (96% - 96%)    PE:   Appearance: NAD	  HEENT:  NC/AT, anicteric  Cardiovascular: Normal S1 S2, No JVD, No murmurs, No edema  Respiratory: Lungs clear to auscultation	  Gastrointestinal:  Soft, Non-tender, + BS	  Skin: warm and dry, no rash or urticaria   Neurologic: Non-focal  Extremities: Normal range of motion,    ASSESSMENT:  Pt is COVID positive and symptomatic who was referred to the infusion center for elective Monoclonal antibody infusion (Casirivimab/Imdevimab - [facility]).  PLAN:  - Infusion procedure explained to patient.  1. FDA has authorized emergency use Casirivimab/Imdevimab, which is not an FDA-approved biological product.  2. The patient or patient's caregiver has the option to accept or refuse administration of Casirivimab/Imdevimab.   3. The significant known and potential risks and benefits of Casirivimab/Imdevimab and the extent to which such risks and benefits are unknown.  4. Information on available alternative treatments and risks and benefits of those alternatives.  5.  Patient verbalized understanding of plan and agrees to treatment.  6.  All questions answered.  - Consent for monoclonal antibody infusion obtained.   - Infuse Casirivimab/Imdevimab (600mg/600mg) IV over 21 minutes.  - Observe patient for one hour post infusion, and then if stable discharge home with oupt follow up as planned by PMD.      POST INFUSION ASSESSMENT:   Patient completed infusion, and monitored x 1 hour post-infusion with no adverse reactions noted, remained hemodynamically stable.    Vital Signs Last 24 Hrs  T(C): 37.2 (10 Dec 2021 17:27), Max: 37.3 (10 Dec 2021 15:48)  T(F): 98.9 (10 Dec 2021 17:27), Max: 99.2 (10 Dec 2021 15:48)  HR: 86 (10 Dec 2021 17:27) (83 - 95)  BP: 115/80 (10 Dec 2021 17:27) (111/78 - 123/84)  BP(mean): --  RR: 18 (10 Dec 2021 17:27) (16 - 18)  SpO2: 100% (10 Dec 2021 17:27) (96% - 100%)    DISCHARGE at 1730.      - Patient tolerated infusion well; denies complaints of chest pain/SOB/dizziness/palpitations.   - VSS for discharge home.  - D/C instructions given/ fact sheet included.  - Patient advised to follow-up with PCP.

## 2021-12-11 ENCOUNTER — TRANSCRIPTION ENCOUNTER (OUTPATIENT)
Age: 56
End: 2021-12-11

## 2021-12-13 ENCOUNTER — APPOINTMENT (OUTPATIENT)
Dept: DISASTER EMERGENCY | Facility: HOSPITAL | Age: 56
End: 2021-12-13

## 2022-01-06 ENCOUNTER — APPOINTMENT (OUTPATIENT)
Dept: GASTROENTEROLOGY | Facility: CLINIC | Age: 57
End: 2022-01-06
Payer: COMMERCIAL

## 2022-01-06 VITALS
HEART RATE: 78 BPM | SYSTOLIC BLOOD PRESSURE: 115 MMHG | HEIGHT: 62 IN | RESPIRATION RATE: 16 BRPM | DIASTOLIC BLOOD PRESSURE: 70 MMHG | WEIGHT: 153 LBS | TEMPERATURE: 98 F | OXYGEN SATURATION: 97 % | BODY MASS INDEX: 28.16 KG/M2

## 2022-01-06 PROCEDURE — 99214 OFFICE O/P EST MOD 30 MIN: CPT

## 2022-01-06 RX ORDER — APIXABAN 2.5 MG/1
2.5 TABLET, FILM COATED ORAL
Refills: 0 | Status: DISCONTINUED | COMMUNITY

## 2022-01-06 NOTE — HISTORY OF PRESENT ILLNESS
[de-identified] : Patient with history of IBS worse after hysterectomy.  Predominantly she has constipation although occasionally she also have diarrhea.  The constipation is actually gotten worse.  She is been taking Metamucil but still having symptoms.  She tried dicyclomine in August but this did not help her.  She also complains of some eructation as well.  Has no rectal bleeding.  The patient had a colonoscopy 4 years ago with those results not available for my review.

## 2022-01-06 NOTE — ASSESSMENT
[FreeTextEntry1] : I discussed with the patient that before consider further evaluation such as another colonoscopy I had like to review her old records and we have requested those.  The colonoscopies truly are unremarkable I think it be better to try to treat her empirically or do other evaluation and repeat her colonoscopy.  In the interim I recommend her taking Librax 1 4 times daily before meals and at bedtime in addition to the Metamucil.  Hopefully this will help ameliorate her symptoms I reviewed the benefits mechanisms and possible side effects of the medication with the patient and told her to call me if any significant side effects occurred.  She will call me after I get the results of the records to discuss further as well as to see the response of this therapy.

## 2022-01-25 ENCOUNTER — NON-APPOINTMENT (OUTPATIENT)
Age: 57
End: 2022-01-25

## 2022-06-23 ENCOUNTER — NON-APPOINTMENT (OUTPATIENT)
Age: 57
End: 2022-06-23

## 2022-06-27 ENCOUNTER — NON-APPOINTMENT (OUTPATIENT)
Age: 57
End: 2022-06-27

## 2022-06-28 ENCOUNTER — NON-APPOINTMENT (OUTPATIENT)
Age: 57
End: 2022-06-28

## 2022-07-15 NOTE — ED ADULT NURSE NOTE - SUICIDE SCREENING QUESTION 2
[FreeTextEntry1] : s/p right knee scope for pmm, plm, synovectomy, chondroplasty on 4/18/22.  oa present.  some increased pain last few days. no fevers and chills. 
No

## 2022-08-11 ENCOUNTER — APPOINTMENT (OUTPATIENT)
Dept: GASTROENTEROLOGY | Facility: CLINIC | Age: 57
End: 2022-08-11

## 2022-08-11 VITALS
TEMPERATURE: 97.2 F | OXYGEN SATURATION: 98 % | BODY MASS INDEX: 27.8 KG/M2 | DIASTOLIC BLOOD PRESSURE: 89 MMHG | RESPIRATION RATE: 14 BRPM | HEART RATE: 70 BPM | WEIGHT: 152 LBS | SYSTOLIC BLOOD PRESSURE: 126 MMHG

## 2022-08-11 DIAGNOSIS — K59.00 CONSTIPATION, UNSPECIFIED: ICD-10-CM

## 2022-08-11 PROCEDURE — 99214 OFFICE O/P EST MOD 30 MIN: CPT

## 2022-08-11 RX ORDER — ASPIRIN ENTERIC COATED TABLETS 81 MG 81 MG/1
TABLET, DELAYED RELEASE ORAL
Refills: 0 | Status: DISCONTINUED | COMMUNITY
End: 2022-08-11

## 2022-08-11 RX ORDER — SODIUM PICOSULFATE, MAGNESIUM OXIDE, AND ANHYDROUS CITRIC ACID 10; 3.5; 12 MG/160ML; G/160ML; G/160ML
10-3.5-12 MG-GM LIQUID ORAL
Qty: 1 | Refills: 0 | Status: ACTIVE | COMMUNITY
Start: 2022-08-11 | End: 1900-01-01

## 2022-08-11 RX ORDER — ONDANSETRON 4 MG/1
4 TABLET, ORALLY DISINTEGRATING ORAL
Qty: 20 | Refills: 0 | Status: DISCONTINUED | COMMUNITY
Start: 2022-06-03

## 2022-08-11 RX ORDER — MENTHOL/CAMPHOR 0.5 %-0.5%
1000 LOTION (ML) TOPICAL
Refills: 0 | Status: DISCONTINUED | COMMUNITY
End: 2022-08-11

## 2022-08-11 RX ORDER — DICYCLOMINE HYDROCHLORIDE 20 MG/1
20 TABLET ORAL
Qty: 90 | Refills: 2 | Status: DISCONTINUED | COMMUNITY
Start: 2021-08-05 | End: 2022-08-11

## 2022-08-11 RX ORDER — BENZONATATE 200 MG/1
200 CAPSULE ORAL
Qty: 30 | Refills: 0 | Status: DISCONTINUED | COMMUNITY
Start: 2022-06-03

## 2022-08-11 RX ORDER — CEPHALEXIN 500 MG/1
500 CAPSULE ORAL
Qty: 14 | Refills: 0 | Status: DISCONTINUED | COMMUNITY
Start: 2022-06-27

## 2022-08-11 RX ORDER — CHLORDIAZEPOXIDE HYDROCHLORIDE AND CLIDINIUM BROMIDE 5; 2.5 MG/1; MG/1
5-2.5 CAPSULE, GELATIN COATED ORAL
Qty: 120 | Refills: 1 | Status: DISCONTINUED | COMMUNITY
Start: 2022-01-06 | End: 2022-08-11

## 2022-08-11 NOTE — HISTORY OF PRESENT ILLNESS
[de-identified] : Patient with complaints of constipation as well as postprandial discomfort and urge to move her bowels.  Sometimes her bowel movements are normal bilaterally otherwise are pellet-like and couple months ago she had an episode of impaction which resolved.  She is currently taking MiraLAX as the Citrucel did not work and it is helping her but she still symptomatic.  Colonoscopy was last done more than 5 years ago was unremarkable.  Recent blood work was reviewed and that was also unremarkable.  Patient has not lost any weight since last year.  She has no rectal bleeding and no diarrhea.  Sometimes if she eats "the wrong thing"'s she has the urge to run to the bathroom.

## 2022-08-11 NOTE — ASSESSMENT
[FreeTextEntry1] : I discussed with the patient that her symptoms are most consistent with constipation predominant IBS but its been more than 5 years since her last colonoscopy and would be appropriate to do a colonoscopy to evaluate other etiologies such as a colorectal neoplasm.  The indications benefits risks alternatives were discussed with the patient and she is medically optimal for the planned procedure Clenpiq was ordered and she will do a COVID-19 test prior to the procedure.  In addition I said that given her symptoms of IBS it might be helpful to see if dicyclomine helps her in terms of her discomfort and urgency.  I reviewed the indications benefits as well as the possible risks and side effects and told her to call me if any of those occurred.

## 2022-08-15 ENCOUNTER — NON-APPOINTMENT (OUTPATIENT)
Age: 57
End: 2022-08-15

## 2022-08-15 DIAGNOSIS — R11.0 NAUSEA: ICD-10-CM

## 2022-08-15 RX ORDER — ONDANSETRON 4 MG/1
4 TABLET ORAL EVERY 6 HOURS
Qty: 30 | Refills: 1 | Status: ACTIVE | COMMUNITY
Start: 2022-08-15 | End: 1900-01-01

## 2022-08-16 ENCOUNTER — APPOINTMENT (OUTPATIENT)
Dept: CARDIOLOGY | Facility: CLINIC | Age: 57
End: 2022-08-16

## 2022-11-08 ENCOUNTER — APPOINTMENT (OUTPATIENT)
Dept: CARDIOLOGY | Facility: CLINIC | Age: 57
End: 2022-11-08

## 2022-11-08 DIAGNOSIS — I26.99 OTHER PULMONARY EMBOLISM W/OUT ACUTE COR PULMONALE: ICD-10-CM

## 2022-11-08 PROCEDURE — 99213 OFFICE O/P EST LOW 20 MIN: CPT

## 2022-11-10 PROBLEM — I26.99 PULMONARY EMBOLISM: Status: ACTIVE | Noted: 2021-03-16

## 2022-11-10 NOTE — HISTORY OF PRESENT ILLNESS
[FreeTextEntry1] : 11/8/2022\par Doing well\par no issues\par off aspirin \par off apixaban\par no cp or sob\par BP and Hr well controlled\par no meds\par \par 6/29/2021\par \par last seen in march\par On Apixban 2.5mg BID\par Labs ok yesterday.  Dimer is negative\par Feeling good\par She is fully recovered from surgery.\par no issues with eliquis.\par Going to dentist tomorrow, needs extraction\par last dose of eliquis was yesterday morning.\par \par 120/80\par 87\par 97% room air. \par \par 3/23/2021\par \par Followp visit\par doing well with eliquis 5mg BID\par no bleeding issues\par she has recovered from surgery\par walking, running no issues\par [place] work. \par plans to travel later this month by airplane\par d-dimer recently was negative.

## 2022-11-10 NOTE — PHYSICAL EXAM
[General Appearance - Well Developed] : well developed [Normal Appearance] : normal appearance [Well Groomed] : well groomed [General Appearance - Well Nourished] : well nourished [No Deformities] : no deformities [General Appearance - In No Acute Distress] : no acute distress [Normal Conjunctiva] : the conjunctiva exhibited no abnormalities [Eyelids - No Xanthelasma] : the eyelids demonstrated no xanthelasmas [Normal Oral Mucosa] : normal oral mucosa [No Oral Pallor] : no oral pallor [No Oral Cyanosis] : no oral cyanosis [Normal Jugular Venous A Waves Present] : normal jugular venous A waves present [Normal Jugular Venous V Waves Present] : normal jugular venous V waves present [No Jugular Venous Ko A Waves] : no jugular venous ko A waves [Respiration, Rhythm And Depth] : normal respiratory rhythm and effort [Exaggerated Use Of Accessory Muscles For Inspiration] : no accessory muscle use [Auscultation Breath Sounds / Voice Sounds] : lungs were clear to auscultation bilaterally [Heart Rate And Rhythm] : heart rate and rhythm were normal [Heart Sounds] : normal S1 and S2 [Murmurs] : no murmurs present [Abnormal Walk] : normal gait [Gait - Sufficient For Exercise Testing] : the gait was sufficient for exercise testing [Nail Clubbing] : no clubbing of the fingernails [Cyanosis, Localized] : no localized cyanosis [Petechial Hemorrhages (___cm)] : no petechial hemorrhages [] : no ischemic changes [FreeTextEntry1] : Soft, incision is healed.

## 2022-11-10 NOTE — ASSESSMENT
[FreeTextEntry1] : 56 yo F with hx of ANURADHA/BSO with abdominoplasty, lipo, sacrocolpopexy on\par 11/24/20 who presents with SOB and chest pain, found to have RLL PE.\par Hemodynamically stable, bnp/trop negative, but remains symptomatic w/\par chest/back pain.\par \par 1. RLL PE - unclear if clearly provoked given she was 3-4 post op and has\par significant family history\par \par \par Plan:\par 1.   Eliquis 2.5mg x 1 , 1 hour prior to flights or long car rides - rx sent in\par 2.  Ambulation\par 3.  Discussed red flags of recurrent VTE\par 4.  No vascular testing at this time\par 5.  Return in 1 year \par

## 2023-01-27 ENCOUNTER — NON-APPOINTMENT (OUTPATIENT)
Age: 58
End: 2023-01-27

## 2023-01-28 LAB — SARS-COV-2 N GENE NPH QL NAA+PROBE: NOT DETECTED

## 2023-01-30 ENCOUNTER — TRANSCRIPTION ENCOUNTER (OUTPATIENT)
Age: 58
End: 2023-01-30

## 2023-01-31 ENCOUNTER — RESULT REVIEW (OUTPATIENT)
Age: 58
End: 2023-01-31

## 2023-01-31 ENCOUNTER — APPOINTMENT (OUTPATIENT)
Dept: GASTROENTEROLOGY | Facility: GI CENTER | Age: 58
End: 2023-01-31
Payer: COMMERCIAL

## 2023-01-31 ENCOUNTER — OUTPATIENT (OUTPATIENT)
Dept: OUTPATIENT SERVICES | Facility: HOSPITAL | Age: 58
LOS: 1 days | End: 2023-01-31
Payer: COMMERCIAL

## 2023-01-31 DIAGNOSIS — K59.00 CONSTIPATION, UNSPECIFIED: ICD-10-CM

## 2023-01-31 DIAGNOSIS — Z90.49 ACQUIRED ABSENCE OF OTHER SPECIFIED PARTS OF DIGESTIVE TRACT: Chronic | ICD-10-CM

## 2023-01-31 DIAGNOSIS — Z98.890 OTHER SPECIFIED POSTPROCEDURAL STATES: Chronic | ICD-10-CM

## 2023-01-31 DIAGNOSIS — R10.13 EPIGASTRIC PAIN: ICD-10-CM

## 2023-01-31 DIAGNOSIS — K58.9 IRRITABLE BOWEL SYNDROME W/OUT DIARRHEA: ICD-10-CM

## 2023-01-31 DIAGNOSIS — K21.9 GASTRO-ESOPHAGEAL REFLUX DISEASE W/OUT ESOPHAGITIS: ICD-10-CM

## 2023-01-31 DIAGNOSIS — Z90.710 ACQUIRED ABSENCE OF BOTH CERVIX AND UTERUS: Chronic | ICD-10-CM

## 2023-01-31 PROCEDURE — 88305 TISSUE EXAM BY PATHOLOGIST: CPT

## 2023-01-31 PROCEDURE — 88305 TISSUE EXAM BY PATHOLOGIST: CPT | Mod: 26

## 2023-01-31 PROCEDURE — 88342 IMHCHEM/IMCYTCHM 1ST ANTB: CPT

## 2023-01-31 PROCEDURE — 45378 DIAGNOSTIC COLONOSCOPY: CPT

## 2023-01-31 PROCEDURE — 43239 EGD BIOPSY SINGLE/MULTIPLE: CPT

## 2023-01-31 PROCEDURE — 43239 EGD BIOPSY SINGLE/MULTIPLE: CPT | Mod: 59

## 2023-01-31 PROCEDURE — 88342 IMHCHEM/IMCYTCHM 1ST ANTB: CPT | Mod: 26

## 2023-01-31 NOTE — REASON FOR VISIT
[Procedure: _________] : a [unfilled] procedure visit [FreeTextEntry1] : Change in bowel habits constipation GERD

## 2023-02-03 ENCOUNTER — NON-APPOINTMENT (OUTPATIENT)
Age: 58
End: 2023-02-03

## 2023-02-03 LAB — SURGICAL PATHOLOGY STUDY: SIGNIFICANT CHANGE UP

## 2023-02-06 ENCOUNTER — RESULT REVIEW (OUTPATIENT)
Age: 58
End: 2023-02-06

## 2023-02-06 DIAGNOSIS — Z86.711 PERSONAL HISTORY OF PULMONARY EMBOLISM: ICD-10-CM

## 2023-02-09 ENCOUNTER — NON-APPOINTMENT (OUTPATIENT)
Age: 58
End: 2023-02-09

## 2023-02-09 ENCOUNTER — APPOINTMENT (OUTPATIENT)
Dept: ULTRASOUND IMAGING | Facility: CLINIC | Age: 58
End: 2023-02-09
Payer: COMMERCIAL

## 2023-02-09 PROCEDURE — 93925 LOWER EXTREMITY STUDY: CPT

## 2023-04-27 ENCOUNTER — APPOINTMENT (OUTPATIENT)
Dept: RHEUMATOLOGY | Facility: CLINIC | Age: 58
End: 2023-04-27
Payer: COMMERCIAL

## 2023-04-27 ENCOUNTER — LABORATORY RESULT (OUTPATIENT)
Age: 58
End: 2023-04-27

## 2023-04-27 VITALS
DIASTOLIC BLOOD PRESSURE: 72 MMHG | HEIGHT: 62 IN | OXYGEN SATURATION: 98 % | TEMPERATURE: 97.2 F | HEART RATE: 80 BPM | BODY MASS INDEX: 27.05 KG/M2 | SYSTOLIC BLOOD PRESSURE: 109 MMHG | WEIGHT: 147 LBS

## 2023-04-27 DIAGNOSIS — I26.99 OTHER PULMONARY EMBOLISM W/OUT ACUTE COR PULMONALE: ICD-10-CM

## 2023-04-27 DIAGNOSIS — I73.00 RAYNAUD'S SYNDROME W/OUT GANGRENE: ICD-10-CM

## 2023-04-27 PROCEDURE — 99205 OFFICE O/P NEW HI 60 MIN: CPT

## 2023-04-28 PROBLEM — I26.99 ACUTE PULMONARY EMBOLISM: Status: ACTIVE | Noted: 2021-01-12

## 2023-04-28 LAB
ALBUMIN SERPL ELPH-MCNC: 4.3 G/DL
ALP BLD-CCNC: 56 U/L
ALT SERPL-CCNC: 12 U/L
ANA PAT FLD IF-IMP: ABNORMAL
ANA SER IF-ACNC: ABNORMAL
ANION GAP SERPL CALC-SCNC: 11 MMOL/L
APPEARANCE: CLEAR
AST SERPL-CCNC: 14 U/L
BASOPHILS # BLD AUTO: 0.04 K/UL
BASOPHILS NFR BLD AUTO: 0.4 %
BILIRUB SERPL-MCNC: 0.3 MG/DL
BILIRUBIN URINE: NEGATIVE
BLOOD URINE: NEGATIVE
BUN SERPL-MCNC: 16 MG/DL
C3 SERPL-MCNC: 100 MG/DL
C4 SERPL-MCNC: 33 MG/DL
CALCIUM SERPL-MCNC: 9.7 MG/DL
CHLORIDE SERPL-SCNC: 107 MMOL/L
CO2 SERPL-SCNC: 26 MMOL/L
COLOR: YELLOW
CREAT SERPL-MCNC: 0.85 MG/DL
CREAT SPEC-SCNC: 71 MG/DL
CREAT/PROT UR: 0.1 RATIO
CRP SERPL-MCNC: <3 MG/L
EGFR: 81 ML/MIN/1.73M2
EOSINOPHIL # BLD AUTO: 0.18 K/UL
EOSINOPHIL NFR BLD AUTO: 1.7 %
ERYTHROCYTE [SEDIMENTATION RATE] IN BLOOD BY WESTERGREN METHOD: 5 MM/HR
GLUCOSE QUALITATIVE U: NEGATIVE MG/DL
GLUCOSE SERPL-MCNC: 85 MG/DL
HCT VFR BLD CALC: 42.4 %
HGB BLD-MCNC: 13.8 G/DL
IMM GRANULOCYTES NFR BLD AUTO: 0.1 %
KETONES URINE: NEGATIVE MG/DL
LEUKOCYTE ESTERASE URINE: ABNORMAL
LYMPHOCYTES # BLD AUTO: 3.63 K/UL
LYMPHOCYTES NFR BLD AUTO: 34.8 %
MAN DIFF?: NORMAL
MCHC RBC-ENTMCNC: 28.7 PG
MCHC RBC-ENTMCNC: 32.5 GM/DL
MCV RBC AUTO: 88.1 FL
MONOCYTES # BLD AUTO: 0.6 K/UL
MONOCYTES NFR BLD AUTO: 5.8 %
NEUTROPHILS # BLD AUTO: 5.96 K/UL
NEUTROPHILS NFR BLD AUTO: 57.2 %
NITRITE URINE: NEGATIVE
PH URINE: 6
PLATELET # BLD AUTO: 246 K/UL
POTASSIUM SERPL-SCNC: 3.9 MMOL/L
PROT SERPL-MCNC: 6.9 G/DL
PROT UR-MCNC: 6 MG/DL
PROTEIN URINE: NEGATIVE MG/DL
RBC # BLD: 4.81 M/UL
RBC # FLD: 13.9 %
SODIUM SERPL-SCNC: 144 MMOL/L
SPECIFIC GRAVITY URINE: 1.01
TSH SERPL-ACNC: 1.14 UIU/ML
UROBILINOGEN URINE: 0.2 MG/DL
WBC # FLD AUTO: 10.42 K/UL

## 2023-04-28 NOTE — HISTORY OF PRESENT ILLNESS
[FreeTextEntry1] : 57 yof with hx of RP - hands/feet\par Was a patient here 7 years ago, dx with primary RP\par Was unable to tolerate CaCh blockers x 2 due to headache\par since last visit had post-op PE \par not on AC now \par no new issues other than the continued Raynauds and has digital numbness at times \par \par No sx to suggest that she has Scl, MCTD or SLE\par

## 2023-04-28 NOTE — PHYSICAL EXAM
[General Appearance - Alert] : alert [General Appearance - In No Acute Distress] : in no acute distress [Sclera] : the sclera and conjunctiva were normal [PERRL With Normal Accommodation] : pupils were equal in size, round, and reactive to light [Extraocular Movements] : extraocular movements were intact [Outer Ear] : the ears and nose were normal in appearance [Oropharynx] : the oropharynx was normal [Auscultation Breath Sounds / Voice Sounds] : lungs were clear to auscultation bilaterally [Murmurs] : no murmurs [Heart Sounds] : normal S1 and S2 [Edema] : there was no peripheral edema [FreeTextEntry1] : digits cool w/o  color change fingers w/ modest cap refill, bilat toes cool,-no skin breakdown, no pus, non-tender [] : no rash [No Focal Deficits] : no focal deficits [Oriented To Time, Place, And Person] : oriented to person, place, and time [Impaired Insight] : insight and judgment were intact [Affect] : the affect was normal

## 2023-04-28 NOTE — ASSESSMENT
[FreeTextEntry1] : 57 yof with hx of RP - hands/feet\par Was a patient here 7 years ago, dx with primary RP\par Was unable to tolerate CaCh blockers x 2 due to headache\par since last visit had post-op PE \par not on AC now \par no new issues other than the continued Raynauds and has digital numbness at times \par \par Still seems most c/w Primary RP\par Will repeat labs \par trial of nitrobid\par echo ordered \par consider alternative agent for RP sildenafil \par patient counseled on risks/benefits/potential SE of medication\par

## 2023-04-29 LAB
CENTROMERE IGG SER-ACNC: <0.2 AL
DSDNA AB SER-ACNC: <12 IU/ML
ENA JO1 AB SER IA-ACNC: <0.2 AL
ENA RNP AB SER IA-ACNC: <0.2 AL
ENA SCL70 IGG SER IA-ACNC: <0.2 AL
ENA SM AB SER IA-ACNC: <0.2 AL

## 2023-05-10 ENCOUNTER — NON-APPOINTMENT (OUTPATIENT)
Age: 58
End: 2023-05-10

## 2023-05-10 LAB
G6PD SER-CCNC: 14.1 U/G HGB
PS IGA SER QL: 1
PS IGG SER QL: <9 UNITS
PS IGM SER QL: 27 UNITS
RNA POLYMERASE III IGG: 6 UNITS

## 2023-05-18 ENCOUNTER — NON-APPOINTMENT (OUTPATIENT)
Age: 58
End: 2023-05-18

## 2023-06-19 NOTE — ED PROVIDER NOTE - EXITCARE/DISCHARGE INSTRUCTIONS
-- Launch [employer] and print the 'Prescriptions from this Visit' Report

## 2023-08-03 ENCOUNTER — APPOINTMENT (OUTPATIENT)
Dept: RHEUMATOLOGY | Facility: CLINIC | Age: 58
End: 2023-08-03

## 2023-08-21 ENCOUNTER — APPOINTMENT (OUTPATIENT)
Dept: RHEUMATOLOGY | Facility: CLINIC | Age: 58
End: 2023-08-21

## 2023-11-14 ENCOUNTER — APPOINTMENT (OUTPATIENT)
Dept: CARDIOLOGY | Facility: CLINIC | Age: 58
End: 2023-11-14
Payer: COMMERCIAL

## 2023-11-14 DIAGNOSIS — I73.00 RAYNAUD'S SYNDROME W/OUT GANGRENE: ICD-10-CM

## 2023-11-14 PROCEDURE — 99214 OFFICE O/P EST MOD 30 MIN: CPT

## 2023-11-20 ENCOUNTER — NON-APPOINTMENT (OUTPATIENT)
Age: 58
End: 2023-11-20

## 2023-11-20 ENCOUNTER — APPOINTMENT (OUTPATIENT)
Dept: INTERNAL MEDICINE | Facility: CLINIC | Age: 58
End: 2023-11-20

## 2023-11-20 ENCOUNTER — TRANSCRIPTION ENCOUNTER (OUTPATIENT)
Age: 58
End: 2023-11-20

## 2023-11-24 ENCOUNTER — TRANSCRIPTION ENCOUNTER (OUTPATIENT)
Age: 58
End: 2023-11-24

## 2023-11-27 ENCOUNTER — TRANSCRIPTION ENCOUNTER (OUTPATIENT)
Age: 58
End: 2023-11-27

## 2023-11-28 ENCOUNTER — TRANSCRIPTION ENCOUNTER (OUTPATIENT)
Age: 58
End: 2023-11-28

## 2023-11-30 ENCOUNTER — NON-APPOINTMENT (OUTPATIENT)
Age: 58
End: 2023-11-30

## 2023-11-30 ENCOUNTER — TRANSCRIPTION ENCOUNTER (OUTPATIENT)
Age: 58
End: 2023-11-30

## 2023-11-30 ENCOUNTER — APPOINTMENT (OUTPATIENT)
Dept: ORTHOPEDIC SURGERY | Facility: CLINIC | Age: 58
End: 2023-11-30
Payer: COMMERCIAL

## 2023-11-30 VITALS
BODY MASS INDEX: 28.71 KG/M2 | HEART RATE: 83 BPM | SYSTOLIC BLOOD PRESSURE: 118 MMHG | DIASTOLIC BLOOD PRESSURE: 84 MMHG | WEIGHT: 160 LBS | HEIGHT: 62.5 IN

## 2023-11-30 DIAGNOSIS — M70.62 TROCHANTERIC BURSITIS, LEFT HIP: ICD-10-CM

## 2023-11-30 DIAGNOSIS — M25.552 PAIN IN LEFT HIP: ICD-10-CM

## 2023-11-30 PROCEDURE — 73502 X-RAY EXAM HIP UNI 2-3 VIEWS: CPT | Mod: LT

## 2023-11-30 PROCEDURE — 99204 OFFICE O/P NEW MOD 45 MIN: CPT

## 2023-11-30 RX ORDER — BACILLUS COAGULANS/INULIN 1B-250 MG
CAPSULE ORAL
Refills: 0 | Status: ACTIVE | COMMUNITY

## 2023-11-30 RX ORDER — VITAMIN E ACID SUCCINATE 268 MG
TABLET ORAL
Refills: 0 | Status: ACTIVE | COMMUNITY

## 2023-11-30 RX ORDER — APIXABAN 2.5 MG/1
2.5 TABLET, FILM COATED ORAL
Qty: 30 | Refills: 3 | Status: DISCONTINUED | COMMUNITY
Start: 2022-11-08 | End: 2023-11-30

## 2023-11-30 RX ORDER — CHROMIUM 200 MCG
TABLET ORAL
Refills: 0 | Status: ACTIVE | COMMUNITY

## 2023-11-30 RX ORDER — MELOXICAM 15 MG/1
15 TABLET ORAL DAILY
Qty: 29 | Refills: 0 | Status: ACTIVE | COMMUNITY
Start: 2023-11-30 | End: 1900-01-01

## 2023-11-30 RX ORDER — DICYCLOMINE HYDROCHLORIDE 20 MG/1
20 TABLET ORAL
Qty: 90 | Refills: 2 | Status: DISCONTINUED | COMMUNITY
Start: 2022-08-11 | End: 2023-11-30

## 2023-11-30 RX ORDER — PSYLLIUM SEED
PACKET (EA) ORAL
Refills: 0 | Status: ACTIVE | COMMUNITY

## 2023-11-30 RX ORDER — PNV NO.95/FERROUS FUM/FOLIC AC 28MG-0.8MG
TABLET ORAL
Refills: 0 | Status: ACTIVE | COMMUNITY

## 2023-11-30 RX ORDER — NITROGLYCERIN 20 MG/G
2 OINTMENT TOPICAL
Qty: 1 | Refills: 1 | Status: DISCONTINUED | COMMUNITY
Start: 2023-04-28 | End: 2023-11-30

## 2023-12-04 ENCOUNTER — TRANSCRIPTION ENCOUNTER (OUTPATIENT)
Age: 58
End: 2023-12-04

## 2023-12-19 ENCOUNTER — TRANSCRIPTION ENCOUNTER (OUTPATIENT)
Age: 58
End: 2023-12-19

## 2023-12-21 ENCOUNTER — NON-APPOINTMENT (OUTPATIENT)
Age: 58
End: 2023-12-21

## 2023-12-22 ENCOUNTER — TRANSCRIPTION ENCOUNTER (OUTPATIENT)
Age: 58
End: 2023-12-22

## 2024-02-05 ENCOUNTER — TRANSCRIPTION ENCOUNTER (OUTPATIENT)
Age: 59
End: 2024-02-05

## 2024-03-13 NOTE — ED ADULT NURSE NOTE - NURSING MUSC ROM
Advance Care Planning     Advance Care Planning Inpatient Note  [facility] Department    Today's Date: 3/13/2024  [facility] [place] [facility]    Received request from IDT Member.  Upon review of chart and communication with care team, patient's decision making abilities are not in question.. Patient was/were present in the room during visit.    Goals of ACP Conversation:  Discuss advance care planning documents    Health Care Decision Makers:     No healthcare decision makers have been documented.  Click here to complete HealthCare Decision Makers including selection of the Healthcare Decision Maker Relationship (ie \"Primary\")  Summary:  No Decision Maker named by patient at this time  Patient and spouse would like to complete together.   discussed HC POA / LW and provided a copy of documents to review.  Patient will contact  office when ready to complete.  Advance Care Planning Documents (Patient Wishes):  None     Assessment:  Patient is aware of need and seems most at-ease with completing documents with spouse.    Interventions:  Encouraged ongoing ACP conversation with future decision makers and loved ones    Care Preferences Communicated:   No    Outcomes/Plan:  ACP Discussion: Postponed    Electronically signed by RENEA Borrero on 3/13/2024 at 10:06 AM            
full range of motion in all extremities

## 2024-03-15 ENCOUNTER — APPOINTMENT (OUTPATIENT)
Dept: ORTHOPEDIC SURGERY | Facility: CLINIC | Age: 59
End: 2024-03-15
Payer: COMMERCIAL

## 2024-03-15 VITALS — BODY MASS INDEX: 31.28 KG/M2 | WEIGHT: 170 LBS | HEIGHT: 62 IN

## 2024-03-15 DIAGNOSIS — M75.41 IMPINGEMENT SYNDROME OF RIGHT SHOULDER: ICD-10-CM

## 2024-03-15 PROCEDURE — 73030 X-RAY EXAM OF SHOULDER: CPT | Mod: RT

## 2024-03-15 PROCEDURE — 99203 OFFICE O/P NEW LOW 30 MIN: CPT

## 2024-03-15 NOTE — PHYSICAL EXAM
[de-identified] : General Exam  Well developed, well nourished  No apparent distress  Oriented to person, place, and time  Mood: Normal  Affect: Normal  Balance and coordination: Normal  Gait: Normal  right  shoulder exam   Inspection: No swelling, ecchymosis or gross deformity. Skin: No masses, No lesions  Tenderness: No bicipital tenderness, no tenderness to the greater tuberosity/RTC insertion, no anterior shoulder/lesser tuberosity tenderness. No tenderness SC joint, clavicle, AC joint. ROM: 160/60/T6 Impingement tests: Positive Arvizu AC Joint: no pain with cross arm testing Biceps: Negative speed Strength: 5/5 abduction, external rotation, and internal rotation Neuro: AIN, PIN, Ulnar nerve motor intact Sensation: Intact to light touch in radial, median, ulnar, and axillary nerve distributions Vasc: 2+ radial pulse  [de-identified] : The following radiographs were ordered and read by me during this patients visit. I reviewed each radiograph in detail with the patient and discussed the findings as highlighted below.   3 views right shoulder obtained today the glenohumeral joint is well-maintained there is normal alignment there is no fracture

## 2024-03-15 NOTE — DISCUSSION/SUMMARY
[de-identified] : Right shoulder impingement rotator cuff tendinopathy symptomatic for about 1 month recommend a course of physical therapy if not improved consider MRI.  All questions answered Tylenol or anti-inflammatory medications as needed for pain

## 2024-03-15 NOTE — HISTORY OF PRESENT ILLNESS
[de-identified] : 59yo female presents complaining of right shoulder pain for 3 weeks.  Around the time she was pushing something.  She developed pain in her shoulder.  Intermittently felt that down her arm to her hand with tingling in her fingers.  For the most part it is her shoulder.  Pain is anterior lateral shoulder worse with overhead activity reach behind back.  She works in a school as a nurse.  She reports pain has gotten a lot better.  The patient's past medical history, past surgical history, medications, allergies, and social history were reviewed by me today with the patient and documented accordingly. In addition, the patient's family history, which is noncontributory to this visit, was also reviewed.

## 2024-03-18 ENCOUNTER — APPOINTMENT (OUTPATIENT)
Dept: MRI IMAGING | Facility: CLINIC | Age: 59
End: 2024-03-18
Payer: COMMERCIAL

## 2024-03-18 PROCEDURE — 73221 MRI JOINT UPR EXTREM W/O DYE: CPT | Mod: RT

## 2024-03-27 ENCOUNTER — APPOINTMENT (OUTPATIENT)
Dept: CT IMAGING | Facility: CLINIC | Age: 59
End: 2024-03-27
Payer: COMMERCIAL

## 2024-03-27 PROCEDURE — 70486 CT MAXILLOFACIAL W/O DYE: CPT

## 2024-03-29 NOTE — ED PROVIDER NOTE - PRINCIPAL DIAGNOSIS
Let pt know I have placed the referral for ID and his office will be contacting her for a visit. He will determine if she needs treatment for the tB or if this is a possible false positive test.    Pulmonary emboli

## 2024-05-05 ENCOUNTER — NON-APPOINTMENT (OUTPATIENT)
Age: 59
End: 2024-05-05

## 2024-07-01 NOTE — ED PROVIDER NOTE - CROS ED HEME ALL NEG
Disp Refills Start End     amLODIPine Besy-Benazepril HCl 5-40 MG Oral Cap 90 capsule 1 6/12/2024 --    Sig - Route: Take 1 capsule by mouth daily. - Oral    Sent to pharmacy as: amLODIPine Besy-Benazepril HCl 5-40 MG Oral Capsule (LOTREL)    E-Prescribing Status: Receipt confirmed by pharmacy (6/12/2024 12:17 PM CDT)      Pharmacy        [id number] [place], [place] - [address] [place] [address] AT SEC OF [facility], [phone], [phone]     
negative...

## 2024-07-16 ENCOUNTER — APPOINTMENT (OUTPATIENT)
Dept: CARDIOLOGY | Facility: CLINIC | Age: 59
End: 2024-07-16

## 2024-08-05 NOTE — ED ADULT NURSE NOTE - CHPI ED NUR SYMPTOMS POS
Ly Alvarez DO office Dept: [phone] PAIN/SHORTNESS OF BREATH

## 2024-08-08 ENCOUNTER — APPOINTMENT (OUTPATIENT)
Dept: ULTRASOUND IMAGING | Facility: CLINIC | Age: 59
End: 2024-08-08

## 2024-08-08 PROCEDURE — 93970 EXTREMITY STUDY: CPT

## 2024-08-09 NOTE — DISCHARGE NOTE NURSING/CASE MANAGEMENT/SOCIAL WORK - NSTOBACCONEVERSMOKERY/N_GEN_A
Lab Results   Component Value Date    5COL Yellow 08/09/2024    5UAPP Clear 08/09/2024    5UGLU >2000 (A) 08/09/2024    5UBILI Negative 08/09/2024    5UKET 80 mg/dL (A) 08/09/2024    5USPG 1.020 08/09/2024    5URBC Negative 08/09/2024    5UPH 6.0 08/09/2024    5UPROT 30 mg/dL (A) 08/09/2024    5UURP 0.2 08/09/2024    POCTUNITR Negative 08/09/2024    5UWBC Negative 08/09/2024      No

## 2024-09-10 ENCOUNTER — NON-APPOINTMENT (OUTPATIENT)
Age: 59
End: 2024-09-10

## 2024-09-11 ENCOUNTER — APPOINTMENT (OUTPATIENT)
Dept: ORTHOPEDIC SURGERY | Facility: CLINIC | Age: 59
End: 2024-09-11
Payer: COMMERCIAL

## 2024-09-11 VITALS — HEIGHT: 72 IN | WEIGHT: 168 LBS | BODY MASS INDEX: 22.75 KG/M2

## 2024-09-11 DIAGNOSIS — M75.41 IMPINGEMENT SYNDROME OF RIGHT SHOULDER: ICD-10-CM

## 2024-09-11 PROCEDURE — 20610 DRAIN/INJ JOINT/BURSA W/O US: CPT | Mod: RT

## 2024-09-11 PROCEDURE — 99214 OFFICE O/P EST MOD 30 MIN: CPT | Mod: 25

## 2024-09-11 NOTE — PHYSICAL EXAM
[de-identified] : right shoulder exam   Inspection: No swelling, ecchymosis or gross deformity. Skin: No masses, No lesions  Tenderness: No bicipital tenderness, no tenderness to the greater tuberosity/RTC insertion, no anterior shoulder/lesser tuberosity tenderness. No tenderness SC joint, clavicle, AC joint. ROM: 160/60/T6 Impingement tests: Positive Arvizu AC Joint: no pain with cross arm testing Biceps: Negative speed Strength: 5/5 abduction, external rotation, and internal rotation Neuro: AIN, PIN, Ulnar nerve motor intact Sensation: Intact to light touch in radial, median, ulnar, and axillary nerve distributions Vasc: 2+ radial pulse  [de-identified] : MRI reviewed right shoulder rotator cuff tendinopathy without tear possible calcific tendinopathy

## 2024-09-11 NOTE — HISTORY OF PRESENT ILLNESS
[de-identified] : 58yo female presents complaining of right shoulder pain for several months.  Around the time she was pushing something.  She developed pain in her shoulder.  Intermittently felt that down her arm to her hand with tingling in her fingers.  For the most part it is her shoulder.  Pain is anterior lateral shoulder worse with overhead activity reach behind back.  She works in a school as a nurse.  She reports pain has gotten a lot better. She is requesting cortisone injection today

## 2024-09-11 NOTE — DISCUSSION/SUMMARY
[de-identified] :   Right shoulder calcific tendinopathy.  We discussed treatment options at length.  She requested injection today  Injection: Right shoulder (Subacromial). Indication: Calcific tendinitis.  A discussion was had with the patient regarding this procedure and all questions were answered. All risks, benefits and alternatives were discussed. These included but were not limited to bleeding, infection, and allergic reaction. Alcohol was used to clean the skin, and betadine was used to sterilize and prep the area in the posterior aspect of the right shoulder. Ethyl chloride spray was then used as a topical anesthetic. A 21-gauge needle was used to inject 4cc of 1% lidocaine and 1cc of 40mg/ml methylprednisolone into the right subacromial space. A sterile bandage was then applied. The patient tolerated the procedure well and there were no complications.    Tylenol anti-inflammatory medication as needed for pain physical therapy and exercise program follow-up as needed

## 2024-09-24 ENCOUNTER — APPOINTMENT (OUTPATIENT)
Dept: CARDIOLOGY | Facility: CLINIC | Age: 59
End: 2024-09-24

## 2024-11-19 ENCOUNTER — APPOINTMENT (OUTPATIENT)
Dept: CARDIOLOGY | Facility: CLINIC | Age: 59
End: 2024-11-19
Payer: COMMERCIAL

## 2024-11-19 DIAGNOSIS — Z86.711 PERSONAL HISTORY OF PULMONARY EMBOLISM: ICD-10-CM

## 2024-11-19 DIAGNOSIS — I70.0 ATHEROSCLEROSIS OF AORTA: ICD-10-CM

## 2024-11-19 PROCEDURE — G2211 COMPLEX E/M VISIT ADD ON: CPT

## 2024-11-19 PROCEDURE — 99214 OFFICE O/P EST MOD 30 MIN: CPT

## 2024-11-26 ENCOUNTER — APPOINTMENT (OUTPATIENT)
Dept: CT IMAGING | Facility: CLINIC | Age: 59
End: 2024-11-26
Payer: COMMERCIAL

## 2024-11-26 PROCEDURE — 75571 CT HRT W/O DYE W/CA TEST: CPT

## 2024-12-03 ENCOUNTER — NON-APPOINTMENT (OUTPATIENT)
Age: 59
End: 2024-12-03

## 2024-12-10 ENCOUNTER — APPOINTMENT (OUTPATIENT)
Dept: CARDIOLOGY | Facility: CLINIC | Age: 59
End: 2024-12-10
Payer: COMMERCIAL

## 2024-12-10 DIAGNOSIS — I70.0 ATHEROSCLEROSIS OF AORTA: ICD-10-CM

## 2024-12-10 PROCEDURE — 93306 TTE W/DOPPLER COMPLETE: CPT

## 2024-12-30 ENCOUNTER — NON-APPOINTMENT (OUTPATIENT)
Age: 59
End: 2024-12-30

## 2025-02-17 ENCOUNTER — EMERGENCY (EMERGENCY)
Facility: HOSPITAL | Age: 60
LOS: 1 days | Discharge: HOME | End: 2025-02-17
Attending: STUDENT IN AN ORGANIZED HEALTH CARE EDUCATION/TRAINING PROGRAM | Admitting: INTERNAL MEDICINE
Payer: COMMERCIAL

## 2025-02-17 VITALS
OXYGEN SATURATION: 100 % | TEMPERATURE: 98 F | RESPIRATION RATE: 18 BRPM | HEIGHT: 63 IN | HEART RATE: 90 BPM | SYSTOLIC BLOOD PRESSURE: 115 MMHG | DIASTOLIC BLOOD PRESSURE: 77 MMHG | WEIGHT: 151.9 LBS

## 2025-02-17 DIAGNOSIS — Z90.49 ACQUIRED ABSENCE OF OTHER SPECIFIED PARTS OF DIGESTIVE TRACT: Chronic | ICD-10-CM

## 2025-02-17 DIAGNOSIS — Z98.890 OTHER SPECIFIED POSTPROCEDURAL STATES: Chronic | ICD-10-CM

## 2025-02-17 DIAGNOSIS — Z90.710 ACQUIRED ABSENCE OF BOTH CERVIX AND UTERUS: Chronic | ICD-10-CM

## 2025-02-17 PROCEDURE — 99285 EMERGENCY DEPT VISIT HI MDM: CPT

## 2025-02-17 PROCEDURE — 99053 MED SERV 10PM-8AM 24 HR FAC: CPT

## 2025-02-17 RX ORDER — EPINEPHRINE 5 MG/ML
0.3 VIAL (ML) INJECTION ONCE
Refills: 0 | Status: DISCONTINUED | OUTPATIENT
Start: 2025-02-17 | End: 2025-02-18

## 2025-02-17 RX ORDER — FAMOTIDINE 10 MG/ML
20 INJECTION INTRAVENOUS ONCE
Refills: 0 | Status: COMPLETED | OUTPATIENT
Start: 2025-02-17 | End: 2025-02-17

## 2025-02-17 RX ORDER — METHYLPREDNISOLONE ACETATE 40 MG/ML
125 VIAL (ML) INJECTION ONCE
Refills: 0 | Status: COMPLETED | OUTPATIENT
Start: 2025-02-17 | End: 2025-02-17

## 2025-02-17 RX ORDER — BACTERIOSTATIC SODIUM CHLORIDE 0.9 %
1000 VIAL (ML) INJECTION ONCE
Refills: 0 | Status: COMPLETED | OUTPATIENT
Start: 2025-02-17 | End: 2025-02-17

## 2025-02-17 NOTE — ED ADULT TRIAGE NOTE - CHIEF COMPLAINT QUOTE
Pt took 2 prilosec and 2 gas x around 930, about 1/2 hour shy became very ithcing, in hands, arms legs and throat felt scratchy, took benadryl and cefcil 1/2 hour ago.

## 2025-02-17 NOTE — ED ADULT NURSE NOTE - OBJECTIVE STATEMENT
Pt stated she took 2 Prilosec and 2 Gas-X at 8:30 for IBS, developed generalized rash and hives with tongue heaviness. She took Benadryl 50 oral, and came to ED. Denies SOB, but has complaint of chest tightness, and nausea

## 2025-02-17 NOTE — ED ADULT TRIAGE NOTE - PATIENT ON (OXYGEN DELIVERY METHOD)
room air
This was a shared visit with the JESSENIA. I reviewed and verified the documentation and independently performed the documented:

## 2025-02-18 VITALS
SYSTOLIC BLOOD PRESSURE: 116 MMHG | DIASTOLIC BLOOD PRESSURE: 60 MMHG | HEART RATE: 88 BPM | RESPIRATION RATE: 17 BRPM | TEMPERATURE: 98 F | OXYGEN SATURATION: 99 %

## 2025-02-18 PROCEDURE — 99285 EMERGENCY DEPT VISIT HI MDM: CPT | Mod: 25

## 2025-02-18 PROCEDURE — 96372 THER/PROPH/DIAG INJ SC/IM: CPT | Mod: XU

## 2025-02-18 PROCEDURE — 96361 HYDRATE IV INFUSION ADD-ON: CPT

## 2025-02-18 PROCEDURE — 96375 TX/PRO/DX INJ NEW DRUG ADDON: CPT

## 2025-02-18 PROCEDURE — 96374 THER/PROPH/DIAG INJ IV PUSH: CPT

## 2025-02-18 RX ORDER — METHYLPREDNISOLONE ACETATE 40 MG/ML
1 VIAL (ML) INJECTION
Qty: 1 | Refills: 0
Start: 2025-02-18 | End: 2025-02-23

## 2025-02-18 RX ORDER — EPINEPHRINE 5 MG/ML
0.3 VIAL (ML) INJECTION
Qty: 1 | Refills: 1
Start: 2025-02-18

## 2025-02-18 RX ORDER — ONDANSETRON 4 MG/1
4 TABLET, ORALLY DISINTEGRATING ORAL ONCE
Refills: 0 | Status: COMPLETED | OUTPATIENT
Start: 2025-02-18 | End: 2025-02-18

## 2025-02-18 RX ORDER — ONDANSETRON 4 MG/1
1 TABLET, ORALLY DISINTEGRATING ORAL
Qty: 15 | Refills: 0
Start: 2025-02-18 | End: 2025-02-22

## 2025-02-18 RX ORDER — EPINEPHRINE 5 MG/ML
0.5 VIAL (ML) INJECTION ONCE
Refills: 0 | Status: COMPLETED | OUTPATIENT
Start: 2025-02-18 | End: 2025-02-18

## 2025-02-18 RX ADMIN — ONDANSETRON 4 MILLIGRAM(S): 4 TABLET, ORALLY DISINTEGRATING ORAL at 00:32

## 2025-02-18 RX ADMIN — FAMOTIDINE 20 MILLIGRAM(S): 10 INJECTION INTRAVENOUS at 00:09

## 2025-02-18 RX ADMIN — Medication 2000 MILLILITER(S): at 00:09

## 2025-02-18 RX ADMIN — Medication 125 MILLIGRAM(S): at 00:08

## 2025-02-18 RX ADMIN — Medication 1000 MILLILITER(S): at 01:47

## 2025-02-18 RX ADMIN — Medication 0.5 MILLIGRAM(S): at 00:18

## 2025-02-18 NOTE — ED PROVIDER NOTE - NSCAREINITIATED _GEN_ER
Called [facility] at [facility] and cancelled Flexeril per patient's request.   Hiren Curry(Attending)

## 2025-02-18 NOTE — ED PROVIDER NOTE - NSFOLLOWUPCLINICS_GEN_ALL_ED_FT
[facility]  Allergy  [address] [place] [address]  Phone: [phone]  Fax:

## 2025-02-18 NOTE — ED PROVIDER NOTE - PATIENT PORTAL LINK FT
You can access the FollowMyHealth Patient Portal offered by [facility] by registering at the following website: [email or web address]. By joining [facility]’s FollowMyHealth portal, you will also be able to view your health information using other applications (apps) compatible with our system.

## 2025-02-18 NOTE — ED PROVIDER NOTE - PROGRESS NOTE DETAILS
I, Dr. Curry, signed this patient out at 0020 to Dr. Steele VSS, patient improved, stable for DC, Rx Epipen and medrol dose pack, OP referral given

## 2025-02-18 NOTE — ED ADULT NURSE REASSESSMENT NOTE - NS ED NURSE REASSESS COMMENT FT1
pt stated she feels better. Was discharged to home with follow up with Allergist. Meds for epi and steroids sent to pharm by provider. Pt was instructed to have meds on hand all times for emergency. Instructed to return to us if symptoms return and worsen. Verbalized understanding

## 2025-02-18 NOTE — ED PROVIDER NOTE - PHYSICAL EXAMINATION
General: well appearing, no acute distress, appropriate weight  HENT:  Head: normocephalic, atraumatic.   Eyes: EOMI, PERRLA, no nystagmus  Nose: atraumatic  Oropharynx: mucosa pink, moist, no lesions, uvula midline. no tongue swelling, lip swelling.     Cardiac: heart RRR, no murmurs, rubs, gallops, pulses 2+ x4  Pulm: lungs mild wheeze  GI: abdomen soft, nontender,  Neuro: A&Ox3  Skin: warm, dry, no laceration, abrasion, contusion. + diffuse rash throughout body

## 2025-02-18 NOTE — ED ADULT NURSE REASSESSMENT NOTE - NSFALLRISKINTERV_ED_ALL_ED

## 2025-02-18 NOTE — ED PROVIDER NOTE - CLINICAL SUMMARY MEDICAL DECISION MAKING FREE TEXT BOX
patient is a 59-year-old female with past med history of IBS, Raynaud's who presents emergency department for allergic reaction.  Patient was experiencing IBS-like symptoms, took Prilosec and Gas-X which patient has taken for many years,  Then noticed a few minutes later developing rash throughout her body, itchiness, chest tightness, nausea.  Patient is never experiences the symptoms before.  Patient took 50 mg of Benadryl prior to arrival without relief of symptoms.  Denies lip swelling, tongue swelling, difficulty breathing, abdominal pain, diarrhea, vomiting.      Patient with 3 organ system involvement, will give epinephrine and treat for anaphylaxis.  Patient requires monitoring there afterwards.

## 2025-07-10 NOTE — ED PROVIDER NOTE - NSICDXPASTMEDICALHX_GEN_ALL_CORE_FT
PAST MEDICAL HISTORY:  Bladder prolapse, female, acquired     Carpal tunnel syndrome     IBS (irritable bowel syndrome)     Raynaud's phenomenon     Right shoulder pain on mobic    Uterine prolapse     
Yes

## 2025-08-12 ENCOUNTER — NON-APPOINTMENT (OUTPATIENT)
Age: 60
End: 2025-08-12

## (undated) DEVICE — FORCEP BIOPSY ENDOSCOPIC 2.8MM DISP